# Patient Record
Sex: FEMALE | Race: WHITE | NOT HISPANIC OR LATINO | Employment: OTHER | URBAN - METROPOLITAN AREA
[De-identification: names, ages, dates, MRNs, and addresses within clinical notes are randomized per-mention and may not be internally consistent; named-entity substitution may affect disease eponyms.]

---

## 2019-01-21 ENCOUNTER — APPOINTMENT (OUTPATIENT)
Dept: GENERAL RADIOLOGY | Facility: HOSPITAL | Age: 84
End: 2019-01-21

## 2019-01-21 ENCOUNTER — APPOINTMENT (OUTPATIENT)
Dept: CT IMAGING | Facility: HOSPITAL | Age: 84
End: 2019-01-21

## 2019-01-21 ENCOUNTER — HOSPITAL ENCOUNTER (INPATIENT)
Facility: HOSPITAL | Age: 84
LOS: 4 days | Discharge: NURSING, REHAB OR LONG-TERM CARE | End: 2019-01-25
Attending: INTERNAL MEDICINE | Admitting: INTERNAL MEDICINE

## 2019-01-21 DIAGNOSIS — R13.10 DYSPHAGIA, UNSPECIFIED TYPE: ICD-10-CM

## 2019-01-21 DIAGNOSIS — Z74.09 IMPAIRED FUNCTIONAL MOBILITY, BALANCE, GAIT, AND ENDURANCE: ICD-10-CM

## 2019-01-21 DIAGNOSIS — Z78.9 IMPAIRED MOBILITY AND ADLS: Primary | ICD-10-CM

## 2019-01-21 DIAGNOSIS — Z74.09 IMPAIRED MOBILITY AND ADLS: Primary | ICD-10-CM

## 2019-01-21 DIAGNOSIS — R41.841 COGNITIVE COMMUNICATION DEFICIT: ICD-10-CM

## 2019-01-21 PROBLEM — E78.5 HYPERLIPIDEMIA: Status: ACTIVE | Noted: 2019-01-21

## 2019-01-21 PROBLEM — I48.92 ATRIAL FLUTTER (HCC): Status: ACTIVE | Noted: 2019-01-21

## 2019-01-21 PROBLEM — N18.9 CKD (CHRONIC KIDNEY DISEASE): Status: ACTIVE | Noted: 2019-01-21

## 2019-01-21 PROBLEM — I50.32 CHRONIC DIASTOLIC CONGESTIVE HEART FAILURE (HCC): Status: ACTIVE | Noted: 2019-01-21

## 2019-01-21 PROBLEM — Z86.73 H/O: CVA (CEREBROVASCULAR ACCIDENT): Status: ACTIVE | Noted: 2019-01-21

## 2019-01-21 PROBLEM — I63.9 STROKE (HCC): Status: ACTIVE | Noted: 2019-01-21

## 2019-01-21 PROBLEM — I10 ESSENTIAL HYPERTENSION: Status: ACTIVE | Noted: 2019-01-21

## 2019-01-21 LAB
ANION GAP SERPL CALCULATED.3IONS-SCNC: 8 MMOL/L (ref 3–11)
BUN BLD-MCNC: 50 MG/DL (ref 9–23)
BUN/CREAT SERPL: 33.6 (ref 7–25)
CALCIUM SPEC-SCNC: 8.3 MG/DL (ref 8.7–10.4)
CHLORIDE SERPL-SCNC: 105 MMOL/L (ref 99–109)
CO2 SERPL-SCNC: 26 MMOL/L (ref 20–31)
CREAT BLD-MCNC: 1.49 MG/DL (ref 0.6–1.3)
DEPRECATED RDW RBC AUTO: 58.1 FL (ref 37–54)
ERYTHROCYTE [DISTWIDTH] IN BLOOD BY AUTOMATED COUNT: 17.1 % (ref 11.3–14.5)
GFR SERPL CREATININE-BSD FRML MDRD: 34 ML/MIN/1.73
GLUCOSE BLD-MCNC: 127 MG/DL (ref 70–100)
GLUCOSE BLDC GLUCOMTR-MCNC: 106 MG/DL (ref 70–130)
HCT VFR BLD AUTO: 42.4 % (ref 34.5–44)
HGB BLD-MCNC: 13.3 G/DL (ref 11.5–15.5)
MAGNESIUM SERPL-MCNC: 1.9 MG/DL (ref 1.3–2.7)
MCH RBC QN AUTO: 29.3 PG (ref 27–31)
MCHC RBC AUTO-ENTMCNC: 31.4 G/DL (ref 32–36)
MCV RBC AUTO: 93.4 FL (ref 80–99)
PHOSPHATE SERPL-MCNC: 4.6 MG/DL (ref 2.4–5.1)
PLATELET # BLD AUTO: 265 10*3/MM3 (ref 150–450)
PMV BLD AUTO: 10.4 FL (ref 6–12)
POTASSIUM BLD-SCNC: 5 MMOL/L (ref 3.5–5.5)
RBC # BLD AUTO: 4.54 10*6/MM3 (ref 3.89–5.14)
SODIUM BLD-SCNC: 139 MMOL/L (ref 132–146)
WBC NRBC COR # BLD: 20.97 10*3/MM3 (ref 3.5–10.8)

## 2019-01-21 PROCEDURE — 93010 ELECTROCARDIOGRAM REPORT: CPT | Performed by: INTERNAL MEDICINE

## 2019-01-21 PROCEDURE — 82962 GLUCOSE BLOOD TEST: CPT

## 2019-01-21 PROCEDURE — 99221 1ST HOSP IP/OBS SF/LOW 40: CPT | Performed by: PSYCHIATRY & NEUROLOGY

## 2019-01-21 PROCEDURE — 99291 CRITICAL CARE FIRST HOUR: CPT | Performed by: INTERNAL MEDICINE

## 2019-01-21 PROCEDURE — 84100 ASSAY OF PHOSPHORUS: CPT | Performed by: NURSE PRACTITIONER

## 2019-01-21 PROCEDURE — 25010000002 FENTANYL CITRATE (PF) 100 MCG/2ML SOLUTION: Performed by: NURSE PRACTITIONER

## 2019-01-21 PROCEDURE — 85027 COMPLETE CBC AUTOMATED: CPT | Performed by: NURSE PRACTITIONER

## 2019-01-21 PROCEDURE — 83735 ASSAY OF MAGNESIUM: CPT | Performed by: NURSE PRACTITIONER

## 2019-01-21 PROCEDURE — 71045 X-RAY EXAM CHEST 1 VIEW: CPT

## 2019-01-21 PROCEDURE — 80048 BASIC METABOLIC PNL TOTAL CA: CPT | Performed by: NURSE PRACTITIONER

## 2019-01-21 PROCEDURE — 70450 CT HEAD/BRAIN W/O DYE: CPT

## 2019-01-21 PROCEDURE — 25010000002 PHENYLEPHRINE 10 MG/ML SOLUTION 5 ML VIAL: Performed by: NURSE PRACTITIONER

## 2019-01-21 PROCEDURE — 93005 ELECTROCARDIOGRAM TRACING: CPT | Performed by: INTERNAL MEDICINE

## 2019-01-21 PROCEDURE — 0 IOPAMIDOL PER 1 ML: Performed by: INTERNAL MEDICINE

## 2019-01-21 PROCEDURE — 70498 CT ANGIOGRAPHY NECK: CPT

## 2019-01-21 PROCEDURE — 70496 CT ANGIOGRAPHY HEAD: CPT

## 2019-01-21 RX ORDER — SODIUM CHLORIDE 0.9 % (FLUSH) 0.9 %
3-10 SYRINGE (ML) INJECTION AS NEEDED
Status: DISCONTINUED | OUTPATIENT
Start: 2019-01-21 | End: 2019-01-25 | Stop reason: HOSPADM

## 2019-01-21 RX ORDER — ACETAMINOPHEN 650 MG/1
650 SUPPOSITORY RECTAL EVERY 4 HOURS PRN
Status: DISCONTINUED | OUTPATIENT
Start: 2019-01-21 | End: 2019-01-25 | Stop reason: HOSPADM

## 2019-01-21 RX ORDER — FENTANYL CITRATE 50 UG/ML
25 INJECTION, SOLUTION INTRAMUSCULAR; INTRAVENOUS EVERY 4 HOURS PRN
Status: DISCONTINUED | OUTPATIENT
Start: 2019-01-21 | End: 2019-01-24

## 2019-01-21 RX ORDER — ATORVASTATIN CALCIUM 40 MG/1
80 TABLET, FILM COATED ORAL NIGHTLY
Status: DISCONTINUED | OUTPATIENT
Start: 2019-01-21 | End: 2019-01-25 | Stop reason: HOSPADM

## 2019-01-21 RX ORDER — SODIUM CHLORIDE, SODIUM LACTATE, POTASSIUM CHLORIDE, CALCIUM CHLORIDE 600; 310; 30; 20 MG/100ML; MG/100ML; MG/100ML; MG/100ML
500 INJECTION, SOLUTION INTRAVENOUS ONCE
Status: DISCONTINUED | OUTPATIENT
Start: 2019-01-21 | End: 2019-01-24

## 2019-01-21 RX ORDER — SODIUM CHLORIDE 0.9 % (FLUSH) 0.9 %
3 SYRINGE (ML) INJECTION EVERY 12 HOURS SCHEDULED
Status: DISCONTINUED | OUTPATIENT
Start: 2019-01-21 | End: 2019-01-25 | Stop reason: HOSPADM

## 2019-01-21 RX ORDER — ASPIRIN 300 MG/1
300 SUPPOSITORY RECTAL DAILY
Status: DISCONTINUED | OUTPATIENT
Start: 2019-01-22 | End: 2019-01-24

## 2019-01-21 RX ORDER — ASPIRIN 325 MG
325 TABLET ORAL DAILY
Status: DISCONTINUED | OUTPATIENT
Start: 2019-01-22 | End: 2019-01-25 | Stop reason: HOSPADM

## 2019-01-21 RX ADMIN — IOPAMIDOL 75 ML: 755 INJECTION, SOLUTION INTRAVENOUS at 18:07

## 2019-01-21 RX ADMIN — FENTANYL CITRATE 25 MCG: 50 INJECTION, SOLUTION INTRAMUSCULAR; INTRAVENOUS at 21:06

## 2019-01-21 RX ADMIN — ACETAMINOPHEN 650 MG: 650 SUPPOSITORY RECTAL at 21:06

## 2019-01-21 RX ADMIN — SODIUM CHLORIDE, PRESERVATIVE FREE 3 ML: 5 INJECTION INTRAVENOUS at 21:12

## 2019-01-21 RX ADMIN — SODIUM CHLORIDE 500 ML: 9 INJECTION, SOLUTION INTRAVENOUS at 20:06

## 2019-01-21 RX ADMIN — PHENYLEPHRINE HYDROCHLORIDE 0.5 MCG/KG/MIN: 10 INJECTION INTRAVENOUS at 21:17

## 2019-01-21 NOTE — NURSING NOTE
"Stroke Navigator CODE 19    HPI    Jennifer Gil is a 83 y.o.  female on whom I am evaluating as a Code 19 for a possible acute stroke.  Mrs. Gil is a resident at [facility] where she is currently receiving inpatient rehabilitation after a recent admission for UTI and pleural effusions.  Her granddaughter was visiting her and states that she was in her normal state of health at approximately 1200.  At 1415 staff noted that she had left sided facial droop, left sided weakness (worse than baseline), and dysarthria.  She was transported to [facility] where she had a CT head which was negative for hemorrhage and/or acute process.  She met criteria for IV alteplase which was discussed with her daughter who agreed to proceed with administration.  Dr. Alvares was contacted by who accepted her for transfer.  He asked [facility] to contact the interventional team to determine if she may be a candidate for neurological intervention.  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 Symptoms are currently unchanged.   · Patient is a candidate for thrombolytic therapy.  She met criteria for alteplase administration and this was started at the OSH.   · Patient is not a candidate for emergent Neuro Intervention due to no LVO (large vessel occlusion) present on CTA head however she does have a \"critical cervical right carotid stenosis\".  This was reviewed by Dr. Reynoso.    Stroke risk factors: atrial fibrillation, carotid stenosis, hyperlipidemia, hypertension " and prior stroke.     Prior stroke history: yes  Location: unknown - see prior imaging  Antiplatelet therapy: Aspirin 81mg  Anticoagulation: none     · Imaging performed: CT head, CTA head and CTA neck; unable to obtain IV access for CT perfusion.    NIHSS    Interval: baseline  1a. Level of Consciousness: 0-->Alert, keenly responsive  1b. LOC Questions: 2-->Answers neither question correctly  1c. LOC Commands: 0-->Performs both tasks correctly  2. Best Gaze: 1-->Partial gaze palsy, gaze is abnormal in one or both eyes, but forced deviation or total gaze paresis is not present  3. Visual: 1-->Partial hemianopia  4. Facial Palsy: 1-->Minor paralysis (flattened nasolabial fold, asymmetry on smiling)  5a. Motor Arm, Left: 3-->No effort against gravity, limb falls  5b. Motor Arm, Right: 0-->No drift, limb holds 90 (or 45) degrees for full 10 secs  6a. Motor Leg, Left: 2-->Some effort against gravity, leg falls to bed by 5 secs, but has some effort against gravity  6b. Motor Leg, Right: 0-->No drift, leg holds 30 degree position for full 5 secs  7. Limb Ataxia: 0-->Absent  8. Sensory: 1-->Mild-to-moderate sensory loss, patient feels pinprick is less sharp or is dull on the affected side, or there is a loss of superficial pain with pinprick, but patient is aware of being touched  9. Best Language: 0-->No aphasia, normal  10. Dysarthria: 2-->Severe dysarthria, patients speech is so slurred as to be unintelligible in the absence of or out of proportion to any dysphasia, or is mute/anarthric  11. Extinction and Inattention (formerly Neglect): 2-->Profound meredith-inattention/extinction more than 1 modality    Total (NIH Stroke Scale): 15    PLAN    Mrs. Gil was examined immediately upon arrival on the [facility].  She has severe focal neurological deficits.  She was taken directly to the CT scanner for STAT imaging.  Dr. Reynoso present in CT scanner.  We were unable to obtain an 18G IV therefore CTA head and neck were performed  "instead of CT perfusion.  Imaging was reviewed immediately upon completion by Dr. Reynoso.  This reveals a \"critical cervical right carotid stenosis, but no large vessel occlusion, which does not require emergent neurointervention.\"  Dr. Reynoso recommends pursuing medical management therapy(dual antiplatelet therapy) for her carotid disease pending further evaluation of her baseline function and recovery from her current ischemic event.    The stroke order set has been initiated.    We will defer to Neurology for further management of CVA.      1855: I spoke with Dr. Alvares to notify him of the patient's arrival.  He has been updated on the patient's current neurological status and vital signs.  He would like the patient's SBP to remain 140-180 for adequate cerebral perfusion.  He has ordered an addition 500mL NS bolus to be given should the patient's SBP drop below 140 and ayse-senephrine if SBP does not respond to bolus.  Spoke with RN re: orders.    Mei Nunez RN/STROKE NAVIGATOR    "

## 2019-01-21 NOTE — NURSING NOTE
ACC REVIEW REPORT: [facility]        PATIENT NAME: Jennifer Gil    PATIENT ID: [id number]    BED: N 222    BED TYPE: ICU    BED GIVEN TO: Ruth    TIME BED GIVEN: 1640    YOB: 1935    AGE: 83    GENDER: F    PREVIOUS ADMIT TO [facility]:     PREVIOUS ADMISSION DATE:     PATIENT CLASS:     TODAY'S DATE: 1/21/2019    TRANSFER DATE: 1-21-19    ETA: 1730    TRANSFERRING FACILITY: [facility]    TRANSFERRING FACILITY PHONE # : [phone]    TRANSFERRING MD: German Jhaveri    DATE/TIME REQUEST RECEIVED: 1-21-19@[facility]    [facility] RN: Deisi Farrell    REPORT FROM: Ruth in the ED    TIME REPORT TAKEN: 1630    DIAGNOSIS: CVA    REASON FOR TRANSFER TO [facility]: higher level of care    TRANSPORTATION: flying    CLINICAL REASON FOR TRANSFER TO [facility]: 83 y.o. Presented to local ED ~1500 with left facial droop, left UE & LE weakness and slurred speech - CT of head neg - TPA given      CLINICAL INFORMATION    HEIGHT:     WEIGHT: 85kg    ALLERGIES: keflex, pcn, muscle relaxer    DOWNS:     INFECTIOUS DISEASE:     ISOLATION:     LAST VITAL SIGNS:  TIME: 1640  TEMP:   PULSE: 94  B/P: 106/71  RESP: 20-30    LAB INFORMATION: WBC 19.45, fsbs 134, bun 36, Cr 1.6, PT 11.7, INR wnl    CULTURE INFORMATION:     MEDS/IV FLUIDS: #20 left forearm - 500cc NS bolus to be given  TPA bolus 7.65 given at 1609 --->68.5 infusing      CARDIAC SYSTEM:    CHEST PAIN: none reported    RHYTHM: afib    Is patient taking or has patient been given any drugs that could increase bleeding? yes  (Plavix, Brilinta, Effient, Eliquis, Xarelto, Warfarin, Integrilin, Angiomax)    DRUG: TPA    DOSE/FREQUENCY: see meds section  (pt takes ASA at home)      RESPIRATORY SYSTEM:    OXYGEN: 2 liters    O2 SAT: 94%    RADIOLOGY RESULTS: CXR - small rt pleural effusion, bibasilar opacity could be atelectasis    RESPIRATORY STATUS: labored at times; pt has a hx of COPD      CNS/MUSCULOSKELETAL    ALERT AND ORIENTED:  yes    DELIA COMA SCALE: 15    STROKE SCALE: 12    CAT  SCAN RESULTS: neg    CNS/MUSCULOSKELETAL NOTES: NIHSS positives: 1 LOC, 1 facial palsy, 2 LUE, 2 LLE, 2 limb ataxia, 2 best language, 2 dysarthria    Pt has a hx of TIA/? Past stroke with slight left sided weakness residual - per family - pt has been self-care; left sided weakness is moderate now      GI//GY NOTES: swallow test has not been done at time of report    PAST MEDICAL HISTORY: CHF, COPD, CVA, HTN    OTHER SYMPTOM NOTES: last known well today at noon    ADDITIONAL NOTES:   Dr. Alvares has been consulted; Dr. Reynoso has been notified  Pt is to go for perfusion scan on arrival          Pat Farrell RN  1/21/2019  4:47 PM

## 2019-01-22 ENCOUNTER — APPOINTMENT (OUTPATIENT)
Dept: CARDIOLOGY | Facility: HOSPITAL | Age: 84
End: 2019-01-22

## 2019-01-22 ENCOUNTER — APPOINTMENT (OUTPATIENT)
Dept: GENERAL RADIOLOGY | Facility: HOSPITAL | Age: 84
End: 2019-01-22

## 2019-01-22 ENCOUNTER — APPOINTMENT (OUTPATIENT)
Dept: CT IMAGING | Facility: HOSPITAL | Age: 84
End: 2019-01-22

## 2019-01-22 ENCOUNTER — APPOINTMENT (OUTPATIENT)
Dept: MRI IMAGING | Facility: HOSPITAL | Age: 84
End: 2019-01-22

## 2019-01-22 PROBLEM — I82.429 ACUTE DEEP VEIN THROMBOSIS (DVT) OF ILIAC VEIN (HCC): Status: ACTIVE | Noted: 2019-01-22

## 2019-01-22 LAB
ANION GAP SERPL CALCULATED.3IONS-SCNC: 8 MMOL/L (ref 3–11)
APTT PPP: 28.7 SECONDS (ref 85–120)
ARTICHOKE IGE QN: 37 MG/DL (ref 0–130)
BASOPHILS # BLD AUTO: 0.07 10*3/MM3 (ref 0–0.2)
BASOPHILS NFR BLD AUTO: 0.4 % (ref 0–1)
BH CV LOW VAS RIGHT COMMON FEMORAL SPONT: 1
BH CV LOW VAS RIGHT DISTAL FEMORAL SPONT: 1
BH CV LOW VAS RIGHT GASTRONEMIUS VESSEL: 1
BH CV LOW VAS RIGHT GREATER SAPH AK VESSEL: 1
BH CV LOW VAS RIGHT GREATER SAPH BK VESSEL: 1
BH CV LOW VAS RIGHT MID FEMORAL SPONT: 1
BH CV LOW VAS RIGHT POPLITEAL SPONT: 1
BH CV LOW VAS RIGHT POSTERIOR TIBIAL VESSEL: 1
BH CV LOW VAS RIGHT PROFUNDA FEMORAL SPONT: 1
BH CV LOW VAS RIGHT PROXIMAL FEMORAL SPONT: 1
BH CV LOW VAS RIGHT SAPHENOFEMORAL JUNCTION SPONT: 1
BH CV LOWER VASCULAR LEFT COMMON FEMORAL AUGMENT: NORMAL
BH CV LOWER VASCULAR LEFT COMMON FEMORAL COMPRESS: NORMAL
BH CV LOWER VASCULAR LEFT COMMON FEMORAL PHASIC: NORMAL
BH CV LOWER VASCULAR LEFT COMMON FEMORAL SPONT: NORMAL
BH CV LOWER VASCULAR LEFT DISTAL FEMORAL AUGMENT: NORMAL
BH CV LOWER VASCULAR LEFT DISTAL FEMORAL COMPRESS: NORMAL
BH CV LOWER VASCULAR LEFT DISTAL FEMORAL PHASIC: NORMAL
BH CV LOWER VASCULAR LEFT DISTAL FEMORAL SPONT: NORMAL
BH CV LOWER VASCULAR LEFT GASTRONEMIUS COMPRESS: NORMAL
BH CV LOWER VASCULAR LEFT GREATER SAPH AK COMPRESS: NORMAL
BH CV LOWER VASCULAR LEFT GREATER SAPH BK COMPRESS: NORMAL
BH CV LOWER VASCULAR LEFT LESSER SAPH COMPRESS: NORMAL
BH CV LOWER VASCULAR LEFT MID FEMORAL AUGMENT: NORMAL
BH CV LOWER VASCULAR LEFT MID FEMORAL COMPRESS: NORMAL
BH CV LOWER VASCULAR LEFT MID FEMORAL PHASIC: NORMAL
BH CV LOWER VASCULAR LEFT MID FEMORAL SPONT: NORMAL
BH CV LOWER VASCULAR LEFT POPLITEAL AUGMENT: NORMAL
BH CV LOWER VASCULAR LEFT POPLITEAL COMPRESS: NORMAL
BH CV LOWER VASCULAR LEFT POPLITEAL PHASIC: NORMAL
BH CV LOWER VASCULAR LEFT POPLITEAL SPONT: NORMAL
BH CV LOWER VASCULAR LEFT POSTERIOR TIBIAL COMPRESS: NORMAL
BH CV LOWER VASCULAR LEFT PROFUNDA FEMORAL COMPRESS: NORMAL
BH CV LOWER VASCULAR LEFT PROFUNDA FEMORAL PHASIC: NORMAL
BH CV LOWER VASCULAR LEFT PROFUNDA FEMORAL SPONT: NORMAL
BH CV LOWER VASCULAR LEFT PROXIMAL FEMORAL AUGMENT: NORMAL
BH CV LOWER VASCULAR LEFT PROXIMAL FEMORAL COMPRESS: NORMAL
BH CV LOWER VASCULAR LEFT PROXIMAL FEMORAL PHASIC: NORMAL
BH CV LOWER VASCULAR LEFT PROXIMAL FEMORAL SPONT: NORMAL
BH CV LOWER VASCULAR LEFT SAPHENOFEMORAL JUNCTION COMPRESS: NORMAL
BH CV LOWER VASCULAR LEFT SAPHENOFEMORAL JUNCTION PHASIC: NORMAL
BH CV LOWER VASCULAR LEFT SAPHENOFEMORAL JUNCTION SPONT: NORMAL
BH CV LOWER VASCULAR RIGHT COMMON FEMORAL COMPRESS: NORMAL
BH CV LOWER VASCULAR RIGHT COMMON FEMORAL PHASIC: NORMAL
BH CV LOWER VASCULAR RIGHT COMMON FEMORAL SPONT: NORMAL
BH CV LOWER VASCULAR RIGHT DISTAL FEMORAL COMPRESS: NORMAL
BH CV LOWER VASCULAR RIGHT DISTAL FEMORAL PHASIC: NORMAL
BH CV LOWER VASCULAR RIGHT DISTAL FEMORAL SPONT: NORMAL
BH CV LOWER VASCULAR RIGHT GASTRONEMIUS COMPRESS: NORMAL
BH CV LOWER VASCULAR RIGHT GREATER SAPH AK COMPRESS: NORMAL
BH CV LOWER VASCULAR RIGHT GREATER SAPH BK COMPRESS: NORMAL
BH CV LOWER VASCULAR RIGHT LESSER SAPH COMPRESS: NORMAL
BH CV LOWER VASCULAR RIGHT MID FEMORAL COMPRESS: NORMAL
BH CV LOWER VASCULAR RIGHT MID FEMORAL PHASIC: NORMAL
BH CV LOWER VASCULAR RIGHT MID FEMORAL SPONT: NORMAL
BH CV LOWER VASCULAR RIGHT POPLITEAL COMPRESS: NORMAL
BH CV LOWER VASCULAR RIGHT POPLITEAL PHASIC: NORMAL
BH CV LOWER VASCULAR RIGHT POPLITEAL SPONT: NORMAL
BH CV LOWER VASCULAR RIGHT POSTERIOR TIBIAL COMPRESS: NORMAL
BH CV LOWER VASCULAR RIGHT PROFUNDA FEMORAL COMPRESS: NORMAL
BH CV LOWER VASCULAR RIGHT PROFUNDA FEMORAL PHASIC: NORMAL
BH CV LOWER VASCULAR RIGHT PROFUNDA FEMORAL SPONT: NORMAL
BH CV LOWER VASCULAR RIGHT PROXIMAL FEMORAL COMPRESS: NORMAL
BH CV LOWER VASCULAR RIGHT PROXIMAL FEMORAL PHASIC: NORMAL
BH CV LOWER VASCULAR RIGHT PROXIMAL FEMORAL SPONT: NORMAL
BH CV LOWER VASCULAR RIGHT SAPHENOFEMORAL JUNCTION COMPRESS: NORMAL
BH CV LOWER VASCULAR RIGHT SAPHENOFEMORAL JUNCTION PHASIC: NORMAL
BH CV LOWER VASCULAR RIGHT SAPHENOFEMORAL JUNCTION SPONT: NORMAL
BUN BLD-MCNC: 43 MG/DL (ref 9–23)
BUN/CREAT SERPL: 31.4 (ref 7–25)
CALCIUM SPEC-SCNC: 8.9 MG/DL (ref 8.7–10.4)
CHLORIDE SERPL-SCNC: 110 MMOL/L (ref 99–109)
CHOLEST SERPL-MCNC: 85 MG/DL (ref 0–200)
CO2 SERPL-SCNC: 21 MMOL/L (ref 20–31)
CREAT BLD-MCNC: 1.37 MG/DL (ref 0.6–1.3)
DEPRECATED RDW RBC AUTO: 57.1 FL (ref 37–54)
DEPRECATED RDW RBC AUTO: 58.2 FL (ref 37–54)
EOSINOPHIL # BLD AUTO: 0.65 10*3/MM3 (ref 0–0.3)
EOSINOPHIL NFR BLD AUTO: 3.5 % (ref 0–3)
ERYTHROCYTE [DISTWIDTH] IN BLOOD BY AUTOMATED COUNT: 17 % (ref 11.3–14.5)
ERYTHROCYTE [DISTWIDTH] IN BLOOD BY AUTOMATED COUNT: 17.5 % (ref 11.3–14.5)
GFR SERPL CREATININE-BSD FRML MDRD: 37 ML/MIN/1.73
GLUCOSE BLD-MCNC: 102 MG/DL (ref 70–100)
GLUCOSE BLDC GLUCOMTR-MCNC: 94 MG/DL (ref 70–130)
HBA1C MFR BLD: 6.5 % (ref 4.8–5.6)
HCT VFR BLD AUTO: 44.1 % (ref 34.5–44)
HCT VFR BLD AUTO: 45 % (ref 34.5–44)
HDLC SERPL-MCNC: 35 MG/DL (ref 40–60)
HGB BLD-MCNC: 13.7 G/DL (ref 11.5–15.5)
HGB BLD-MCNC: 14.1 G/DL (ref 11.5–15.5)
IMM GRANULOCYTES # BLD AUTO: 0.27 10*3/MM3 (ref 0–0.03)
IMM GRANULOCYTES NFR BLD AUTO: 1.5 % (ref 0–0.6)
INR PPP: 1.44 (ref 0.85–1.16)
LYMPHOCYTES # BLD AUTO: 2.69 10*3/MM3 (ref 0.6–4.8)
LYMPHOCYTES NFR BLD AUTO: 14.5 % (ref 24–44)
MAGNESIUM SERPL-MCNC: 2.5 MG/DL (ref 1.3–2.7)
MCH RBC QN AUTO: 28.6 PG (ref 27–31)
MCH RBC QN AUTO: 29.1 PG (ref 27–31)
MCHC RBC AUTO-ENTMCNC: 31.1 G/DL (ref 32–36)
MCHC RBC AUTO-ENTMCNC: 31.3 G/DL (ref 32–36)
MCV RBC AUTO: 92.1 FL (ref 80–99)
MCV RBC AUTO: 92.8 FL (ref 80–99)
MONOCYTES # BLD AUTO: 1.11 10*3/MM3 (ref 0–1)
MONOCYTES NFR BLD AUTO: 6 % (ref 0–12)
NEUTROPHILS # BLD AUTO: 13.78 10*3/MM3 (ref 1.5–8.3)
NEUTROPHILS NFR BLD AUTO: 74.1 % (ref 41–71)
PHOSPHATE SERPL-MCNC: 4.4 MG/DL (ref 2.4–5.1)
PLAT MORPH BLD: NORMAL
PLATELET # BLD AUTO: 246 10*3/MM3 (ref 150–450)
PLATELET # BLD AUTO: 267 10*3/MM3 (ref 150–450)
PMV BLD AUTO: 10 FL (ref 6–12)
PMV BLD AUTO: 9.9 FL (ref 6–12)
POTASSIUM BLD-SCNC: 5 MMOL/L (ref 3.5–5.5)
PROTHROMBIN TIME: 16.9 SECONDS (ref 11.2–14.3)
RBC # BLD AUTO: 4.79 10*6/MM3 (ref 3.89–5.14)
RBC # BLD AUTO: 4.85 10*6/MM3 (ref 3.89–5.14)
RBC MORPH BLD: NORMAL
SODIUM BLD-SCNC: 139 MMOL/L (ref 132–146)
TRIGL SERPL-MCNC: 79 MG/DL (ref 0–150)
TSH SERPL DL<=0.05 MIU/L-ACNC: 1.96 MIU/ML (ref 0.35–5.35)
WBC MORPH BLD: NORMAL
WBC NRBC COR # BLD: 18.57 10*3/MM3 (ref 3.5–10.8)
WBC NRBC COR # BLD: 19.93 10*3/MM3 (ref 3.5–10.8)

## 2019-01-22 PROCEDURE — 82962 GLUCOSE BLOOD TEST: CPT

## 2019-01-22 PROCEDURE — 84100 ASSAY OF PHOSPHORUS: CPT | Performed by: NURSE PRACTITIONER

## 2019-01-22 PROCEDURE — 92523 SPEECH SOUND LANG COMPREHEN: CPT

## 2019-01-22 PROCEDURE — 25010000002 FENTANYL CITRATE (PF) 100 MCG/2ML SOLUTION: Performed by: NURSE PRACTITIONER

## 2019-01-22 PROCEDURE — 99231 SBSQ HOSP IP/OBS SF/LOW 25: CPT | Performed by: PSYCHIATRY & NEUROLOGY

## 2019-01-22 PROCEDURE — 97165 OT EVAL LOW COMPLEX 30 MIN: CPT

## 2019-01-22 PROCEDURE — 85520 HEPARIN ASSAY: CPT

## 2019-01-22 PROCEDURE — 93306 TTE W/DOPPLER COMPLETE: CPT

## 2019-01-22 PROCEDURE — 93970 EXTREMITY STUDY: CPT | Performed by: INTERNAL MEDICINE

## 2019-01-22 PROCEDURE — 92610 EVALUATE SWALLOWING FUNCTION: CPT

## 2019-01-22 PROCEDURE — C1751 CATH, INF, PER/CENT/MIDLINE: HCPCS

## 2019-01-22 PROCEDURE — 97162 PT EVAL MOD COMPLEX 30 MIN: CPT

## 2019-01-22 PROCEDURE — 99291 CRITICAL CARE FIRST HOUR: CPT | Performed by: INTERNAL MEDICINE

## 2019-01-22 PROCEDURE — C1894 INTRO/SHEATH, NON-LASER: HCPCS

## 2019-01-22 PROCEDURE — 83735 ASSAY OF MAGNESIUM: CPT | Performed by: NURSE PRACTITIONER

## 2019-01-22 PROCEDURE — 93306 TTE W/DOPPLER COMPLETE: CPT | Performed by: INTERNAL MEDICINE

## 2019-01-22 PROCEDURE — 70450 CT HEAD/BRAIN W/O DYE: CPT

## 2019-01-22 PROCEDURE — 70551 MRI BRAIN STEM W/O DYE: CPT

## 2019-01-22 PROCEDURE — 97530 THERAPEUTIC ACTIVITIES: CPT

## 2019-01-22 PROCEDURE — 25010000002 HEPARIN (PORCINE) PER 1000 UNITS: Performed by: INTERNAL MEDICINE

## 2019-01-22 PROCEDURE — 25010000002 ONDANSETRON PER 1 MG: Performed by: NURSE PRACTITIONER

## 2019-01-22 PROCEDURE — 85025 COMPLETE CBC W/AUTO DIFF WBC: CPT | Performed by: INTERNAL MEDICINE

## 2019-01-22 PROCEDURE — 71045 X-RAY EXAM CHEST 1 VIEW: CPT

## 2019-01-22 PROCEDURE — 02HV33Z INSERTION OF INFUSION DEVICE INTO SUPERIOR VENA CAVA, PERCUTANEOUS APPROACH: ICD-10-PCS | Performed by: INTERNAL MEDICINE

## 2019-01-22 PROCEDURE — 80048 BASIC METABOLIC PNL TOTAL CA: CPT | Performed by: NURSE PRACTITIONER

## 2019-01-22 PROCEDURE — 85027 COMPLETE CBC AUTOMATED: CPT | Performed by: NURSE PRACTITIONER

## 2019-01-22 PROCEDURE — 84443 ASSAY THYROID STIM HORMONE: CPT | Performed by: NURSE PRACTITIONER

## 2019-01-22 PROCEDURE — 85610 PROTHROMBIN TIME: CPT | Performed by: INTERNAL MEDICINE

## 2019-01-22 PROCEDURE — 80061 LIPID PANEL: CPT | Performed by: NURSE PRACTITIONER

## 2019-01-22 PROCEDURE — 85007 BL SMEAR W/DIFF WBC COUNT: CPT | Performed by: INTERNAL MEDICINE

## 2019-01-22 PROCEDURE — 25010000002 PHENYLEPHRINE 10 MG/ML SOLUTION 5 ML VIAL: Performed by: NURSE PRACTITIONER

## 2019-01-22 PROCEDURE — 93970 EXTREMITY STUDY: CPT

## 2019-01-22 PROCEDURE — 97110 THERAPEUTIC EXERCISES: CPT

## 2019-01-22 PROCEDURE — 85730 THROMBOPLASTIN TIME PARTIAL: CPT | Performed by: INTERNAL MEDICINE

## 2019-01-22 PROCEDURE — 83036 HEMOGLOBIN GLYCOSYLATED A1C: CPT | Performed by: NURSE PRACTITIONER

## 2019-01-22 RX ORDER — SODIUM CHLORIDE 0.9 % (FLUSH) 0.9 %
20 SYRINGE (ML) INJECTION AS NEEDED
Status: DISCONTINUED | OUTPATIENT
Start: 2019-01-22 | End: 2019-01-25 | Stop reason: HOSPADM

## 2019-01-22 RX ORDER — HYDROCODONE BITARTRATE AND ACETAMINOPHEN 7.5; 325 MG/1; MG/1
1 TABLET ORAL 2 TIMES DAILY PRN
COMMUNITY
End: 2019-01-25 | Stop reason: HOSPADM

## 2019-01-22 RX ORDER — SODIUM CHLORIDE 0.9 % (FLUSH) 0.9 %
10 SYRINGE (ML) INJECTION EVERY 12 HOURS SCHEDULED
Status: DISCONTINUED | OUTPATIENT
Start: 2019-01-22 | End: 2019-01-25 | Stop reason: HOSPADM

## 2019-01-22 RX ORDER — FUROSEMIDE 40 MG/1
40 TABLET ORAL 2 TIMES DAILY
COMMUNITY
End: 2019-01-25 | Stop reason: HOSPADM

## 2019-01-22 RX ORDER — SODIUM CHLORIDE 0.9 % (FLUSH) 0.9 %
10 SYRINGE (ML) INJECTION AS NEEDED
Status: DISCONTINUED | OUTPATIENT
Start: 2019-01-22 | End: 2019-01-25 | Stop reason: HOSPADM

## 2019-01-22 RX ORDER — DIPHENHYDRAMINE HCL 25 MG
25 CAPSULE ORAL ONCE
Status: COMPLETED | OUTPATIENT
Start: 2019-01-22 | End: 2019-01-24

## 2019-01-22 RX ORDER — ASPIRIN 81 MG/1
81 TABLET, CHEWABLE ORAL DAILY
COMMUNITY

## 2019-01-22 RX ORDER — RAMIPRIL 2.5 MG/1
2.5 CAPSULE ORAL DAILY
COMMUNITY
End: 2019-01-25 | Stop reason: HOSPADM

## 2019-01-22 RX ORDER — NYSTATIN 100000 [USP'U]/G
POWDER TOPICAL EVERY 12 HOURS SCHEDULED
Status: DISCONTINUED | OUTPATIENT
Start: 2019-01-22 | End: 2019-01-25 | Stop reason: HOSPADM

## 2019-01-22 RX ORDER — CARVEDILOL 12.5 MG/1
12.5 TABLET ORAL 2 TIMES DAILY WITH MEALS
COMMUNITY

## 2019-01-22 RX ORDER — CLONAZEPAM 1 MG/1
1 TABLET ORAL 2 TIMES DAILY PRN
COMMUNITY

## 2019-01-22 RX ORDER — ONDANSETRON 2 MG/ML
4 INJECTION INTRAMUSCULAR; INTRAVENOUS EVERY 6 HOURS PRN
Status: DISCONTINUED | OUTPATIENT
Start: 2019-01-22 | End: 2019-01-25 | Stop reason: HOSPADM

## 2019-01-22 RX ADMIN — FENTANYL CITRATE 25 MCG: 50 INJECTION, SOLUTION INTRAMUSCULAR; INTRAVENOUS at 02:35

## 2019-01-22 RX ADMIN — FENTANYL CITRATE 25 MCG: 50 INJECTION, SOLUTION INTRAMUSCULAR; INTRAVENOUS at 13:53

## 2019-01-22 RX ADMIN — FENTANYL CITRATE 25 MCG: 50 INJECTION, SOLUTION INTRAMUSCULAR; INTRAVENOUS at 06:34

## 2019-01-22 RX ADMIN — FENTANYL CITRATE 25 MCG: 50 INJECTION, SOLUTION INTRAMUSCULAR; INTRAVENOUS at 23:09

## 2019-01-22 RX ADMIN — ACETAMINOPHEN 650 MG: 650 SUPPOSITORY RECTAL at 23:10

## 2019-01-22 RX ADMIN — SODIUM CHLORIDE, PRESERVATIVE FREE 10 ML: 5 INJECTION INTRAVENOUS at 20:26

## 2019-01-22 RX ADMIN — NYSTATIN 1 APPLICATION: 100000 POWDER TOPICAL at 12:52

## 2019-01-22 RX ADMIN — ONDANSETRON 4 MG: 2 INJECTION INTRAMUSCULAR; INTRAVENOUS at 13:53

## 2019-01-22 RX ADMIN — FENTANYL CITRATE 25 MCG: 50 INJECTION, SOLUTION INTRAMUSCULAR; INTRAVENOUS at 18:01

## 2019-01-22 RX ADMIN — PHENYLEPHRINE HYDROCHLORIDE 2 MCG/KG/MIN: 10 INJECTION INTRAVENOUS at 04:45

## 2019-01-22 RX ADMIN — HEPARIN SODIUM 18 UNITS/KG/HR: 10000 INJECTION, SOLUTION INTRAVENOUS at 17:49

## 2019-01-22 RX ADMIN — ASPIRIN 300 MG: 300 SUPPOSITORY RECTAL at 23:50

## 2019-01-22 RX ADMIN — NYSTATIN: 100000 POWDER TOPICAL at 20:26

## 2019-01-22 NOTE — PROGRESS NOTES
Discharge Planning Assessment  [facility]     Patient Name: Jennifer Gil  MRN: [id number]  Today's Date: 1/22/2019    Admit Date: 1/21/2019    Discharge Needs Assessment     Row Name 01/22/19 1425       Living Environment    Lives With  facility resident    Name(s) of Who Lives With Patient  Ms. Gil has been a patient at [facility] in [place] for 7 years.      Current Living Arrangements  residential facility    Primary Care Provided by  other (see comments)    Provides Primary Care For  no one, unable/limited ability to care for self    Family Caregiver if Needed  child(joe), adult    Quality of Family Relationships  supportive    Able to Return to Prior Arrangements  yes       Resource/Environmental Concerns    Resource/Environmental Concerns  none    Transportation Concerns  other (see comments)       Transition Planning    Patient/Family Anticipates Transition to  inpatient rehabilitation facility    Transportation Anticipated  other (see comments)       Discharge Needs Assessment    Readmission Within the Last 30 Days  no previous admission in last 30 days    Concerns to be Addressed  adjustment to diagnosis/illness;basic needs    Equipment Currently Used at Home  hospital bed;walker, rolling;wheelchair    Anticipated Changes Related to Illness  inability to care for self    Discharge Facility/Level of Care Needs  nursing facility, skilled        Discharge Plan     Row Name 01/22/19 8700       Plan    Plan  [facility] in [place]    Patient/Family in Agreement with Plan  yes    Plan Comments  Talked to Ms. Gil's daughter and grandchildren at .  She is a patient at [facility] in [place], [place].  She has been a patient there for 7 years.  She uses a w/c and hospital bed at the facility.  She is dependent c ADL's.  Her PCP is Dr. Keaton Urrutia.  She gets her medications through the [facility].  She has a Medicaid bed hold there.  The goal is to return to [facility] when medically ready.   Will need ambulance for transport.      Final Discharge Disposition Code  03 - skilled nursing facility (SNF)        Destination      No service coordination in this encounter.      Durable Medical Equipment      No service coordination in this encounter.      Dialysis/Infusion      No service coordination in this encounter.      Home Medical Care      No service coordination in this encounter.      Community Resources      No service coordination in this encounter.          Demographic Summary    No documentation.       Functional Status     Row Name 01/22/19 1428       Functional Status    Usual Activity Tolerance  fair    Current Activity Tolerance  poor       Functional Status, IADL    Medications  assistive person    Meal Preparation  assistive person    Housekeeping  assistive person    Laundry  assistive person    Shopping  assistive person       Mental Status Summary    Recent Changes in Mental Status/Cognitive Functioning  unable to assess       Employment/    Employment Status  retired        Psychosocial    No documentation.       Abuse/Neglect    No documentation.       Legal    No documentation.       Substance Abuse    No documentation.       Patient Forms    No documentation.           April Rollins RN

## 2019-01-22 NOTE — PLAN OF CARE
Problem: Patient Care Overview  Goal: Plan of Care Review  Outcome: Ongoing (interventions implemented as appropriate)   01/22/19 0858   Coping/Psychosocial   Plan of Care Reviewed With patient   Plan of Care Review   Progress improving   OTHER   Outcome Summary OT completed a brief chart review relating to presenting physical/cognitive deficits. Pt Mod Ax1 for bed mobility and progressed static sitting balance to CGA. Pt limited d/t L sided weakness and dysarthria. Recommend cont skilled IPOT POC. Recommend pt DC to SNF.        Problem: Stroke (Ischemic) (Adult)  Goal: Signs and Symptoms of Listed Potential Problems Will be Absent, Minimized or Managed (Stroke)  Outcome: Ongoing (interventions implemented as appropriate)   01/22/19 0858   Goal/Outcome Evaluation   Problems Assessed (Stroke (Ischemic)) cognitive impairment;communication impairment;motor/sensory impairment   Problems Assessed (Stroke (Ischemic)) communication impairment;motor/sensory impairment;cognitive impairment

## 2019-01-22 NOTE — THERAPY EVALUATION
Acute Care - Physical Therapy Initial Evaluation  [facility]     Patient Name: Jennifer Gil  : 1935  MRN: [id number]  Today's Date: 2019   Onset of Illness/Injury or Date of Surgery: 19  Date of Referral to PT: 19  Referring Physician: BEAR Osborne      Admit Date: 2019    Visit Dx:     ICD-10-CM ICD-9-CM   1. Impaired mobility and ADLs Z74.09 799.89   2. Impaired functional mobility, balance, gait, and endurance Z74.09 V49.89     Patient Active Problem List   Diagnosis   • Stroke (CMS/HCC)   • Atrial flutter (CMS/HCC)   • CKD (chronic kidney disease)   • Chronic diastolic congestive heart failure (CMS/HCC)   • Essential hypertension   • H/O: CVA (with persistent left sided weakness)   • Hyperlipidemia     Past Medical History:   Diagnosis Date   • Anxiety    • CVA (cerebral vascular accident) (CMS/HCC)    • Diastolic dysfunction    • HLD (hyperlipidemia)    • Hypertension      Past Surgical History:   Procedure Laterality Date   • PARTIAL HYSTERECTOMY          PT ASSESSMENT (last 12 hours)      Physical Therapy Evaluation     Row Name 19 0829          PT Evaluation Time/Intention    Subjective Information  complains of;pain  -LS     Document Type  evaluation  -LS     Patient Effort  good  -LS     Symptoms Noted During/After Treatment  none  -LS     Row Name 19 0829          General Information    Patient Profile Reviewed?  yes  -LS     Onset of Illness/Injury or Date of Surgery  19  -LS     Referring Physician  BEAR Osborne  -LS     Patient Observations  alert;cooperative;agree to therapy  -LS     Prior Level of Function  mod assist:;max assist:;all household mobility;ADL's;bathing;dressing questionable historian; was indep prior to recent illnesses  -LS     Equipment Currently Used at Home  walker, rolling  -LS     Pertinent History of Current Functional Problem  To ED with acute onset of L facial droop, L-sided weakness and dysarthria. S/p tPA and transfer to  [facility]. Found to have R MCA deficits caused by suspected emobolism from R ICA stenosis vs afib. PMH significant for CVA with L residual weakness.   -LS     Existing Precautions/Restrictions  fall;oxygen therapy device and L/min;other (see comments) L-sided weakness  -LS     Limitations/Impairments  safety/cognitive  -LS     Risks Reviewed  patient:;LOB;dizziness;increased discomfort;change in vital signs  -LS     Benefits Reviewed  patient:;improve function;increase independence;increase strength;increase knowledge  -     Barriers to Rehab  medically complex  -     Row Name 01/22/19 0829          Relationship/Environment    Lives With  facility resident  -     Row Name 01/22/19 0829          Resource/Environmental Concerns    Current Living Arrangements  residential facility was undergoing short-term rehab PTA  -     Row Name 01/22/19 0829          Cognitive Assessment/Interventions    Additional Documentation  Cognitive Assessment/Intervention (Group)  -     Row Name 01/22/19 0829          Cognitive Assessment/Intervention- PT/OT    Affect/Mental Status (Cognitive)  confused  -     Orientation Status (Cognition)  oriented to;person;time;place  -     Follows Commands (Cognition)  follows one step commands;75-90% accuracy;repetition of directions required;verbal cues/prompting required;physical/tactile prompts required  -     Cognitive Function (Cognitive)  safety deficit  -     Safety Deficit (Cognitive)  mild deficit;awareness of need for assistance;insight into deficits/self awareness;safety precautions awareness  -     Personal Safety Interventions  fall prevention program maintained;gait belt;nonskid shoes/slippers when out of bed  -     Row Name 01/22/19 0829          Safety Issues, Functional Mobility    Impairments Affecting Function (Mobility)  balance;cognition;coordination;endurance/activity tolerance;motor planning;pain;shortness of breath;strength;postural/trunk control  -     Row  Name 01/22/19 0829          Bed Mobility Assessment/Treatment    Comment (Bed Mobility)  Sitting EOB with OT upon arrival; transitioned to PT eval.   -     Row Name 01/22/19 0829          Transfer Assessment/Treatment    Transfer Assessment/Treatment  bed-chair transfer;sit-stand transfer;stand-sit transfer  -     Comment (Transfers)  Stood initially with mid x2 A; noted difficulty advancing LLE with impulsivity to sit prior to fully squaring hips to edge of recliner. PT/tech on either side of pt for BUE support and blocking knees.   -LS     Bed-Chair [place] (Transfers)  maximum assist (25% patient effort);2 person assist;verbal cues  -LS     Sit-Stand [place] (Transfers)  minimum assist (75% patient effort);2 person assist;verbal cues  -LS     Stand-Sit [place] (Transfers)  moderate assist (50% patient effort);2 person assist;verbal cues  -     Row Name 01/22/19 0829          Gait/Stairs Assessment/Training    [place] Level (Gait)  unable to assess  -     Row Name 01/22/19 0829          General ROM    GENERAL ROM COMMENTS  BLE WFL  -     Row Name 01/22/19 0829          MMT (Manual Muscle Testing)    General MMT Comments  RLE 3+/5; L hip flex 3-/5, L knee ext/ ankle DF grossly 3/5  -     Row Name 01/22/19 0829          Motor Assessment/Intervention    Additional Documentation  Balance (Group);Therapeutic Exercise (Group)  -     Row Name 01/22/19 0829          Therapeutic Exercise    Therapeutic Exercise  supine, lower extremities  -     Additional Documentation  Therapeutic Exercise (Row)  -     93474 - PT Therapeutic Exercise Minutes  5  -     27290 - PT Therapeutic Activity Minutes  3  -     Row Name 01/22/19 0829          Lower Extremity Supine Therapeutic Exercise    Performed, Supine Lower Extremity (Therapeutic Exercise)  hip flexion/extension;hip abduction/adduction;ankle pumps long sitting in recliner  -     Exercise Type, Supine Lower Extremity (Therapeutic  Exercise)  AAROM (active assistive range of motion)  -     Sets/Reps Detail, Supine Lower Extremity (Therapeutic Exercise)  1/10 BLE  -     Row Name 01/22/19 0829          Gross Motor Coordination    Gross Motor Impairments  -- TBA further  -     Row Name 01/22/19 0829          Balance    Balance  static sitting balance;static standing balance  -     Row Name 01/22/19 0829          Static Sitting Balance    Level of [place] (Unsupported Sitting, Static Balance)  minimal assist, 75% patient effort  -LS     Sitting Position (Unsupported Sitting, Static Balance)  sitting on edge of bed  -     Row Name 01/22/19 0829          Static Standing Balance    Level of [place] (Supported Standing, Static Balance)  moderate assist, 50 to 74% patient effort x2  -     Row Name 01/22/19 0829          Sensory Assessment/Intervention    Sensory General Assessment  no sensation deficits identified [facility]  [facility] Name 01/22/19 0829          Pain Assessment    Additional Documentation  Pain Scale: Numbers Pre/Post-Treatment (Group)  -[facility] Name 01/22/19 0829          Pain Scale: Numbers Pre/Post-Treatment    Pain Scale: Numbers, Pretreatment  9/10  -     Pain Scale: Numbers, Post-Treatment  9/10  -     Pain Location  back c/o chronic LBP  -LS     Pre/Post Treatment Pain Comment  tolerated  -     Pain Intervention(s)  Repositioned;Ambulation/increased activity  -     Row Name 01/22/19 0829          Plan of Care Review    Plan of Care Reviewed With  patient  -     Row Name 01/22/19 0829          Physical Therapy Clinical Impression    Date of Referral to PT  01/21/19  -     PT Diagnosis (PT Clinical Impression)  impaired functional mobility, balance, gait  -LS     Patient/Family Goals Statement (PT Clinical Impression)  return to PLOF  -     Criteria for Skilled Interventions Met (PT Clinical Impression)  yes;treatment indicated  -     Rehab Potential (PT Clinical Summary)  good, to achieve  stated therapy goals  -LS     Care Plan Review (PT)  evaluation/treatment results reviewed  -     Row Name 01/22/19 0829          Vital Signs    Pre Systolic BP Rehab  101  -LS     Pre Treatment Diastolic BP  48  -LS     Pretreatment Heart Rate (beats/min)  105  -LS     Posttreatment Heart Rate (beats/min)  130  -LS     O2 Delivery Pre Treatment  supplemental O2  -LS     Post SpO2 (%)  95  -LS     O2 Delivery Post Treatment  supplemental O2  -LS     Pre Patient Position  Sitting  -LS     Intra Patient Position  Standing  -LS     Post Patient Position  Sitting  -LS     Row Name 01/22/19 0829          Physical Therapy Goals    Bed Mobility Goal Selection (PT)  bed mobility, PT goal 1  -LS     Transfer Goal Selection (PT)  transfer, PT goal 1  -LS     Gait Training Goal Selection (PT)  gait training, PT goal 1  -     Row Name 01/22/19 0829          Bed Mobility Goal 1 (PT)    Activity/Assistive Device (Bed Mobility Goal 1, PT)  sit to supine/supine to sit  -LS     [place] Level/Cues Needed (Bed Mobility Goal 1, PT)  minimum assist (75% or more patient effort)  -LS     Time Frame (Bed Mobility Goal 1, PT)  2 weeks  -LS     Progress/Outcomes (Bed Mobility Goal 1, PT)  goal ongoing  -     Row Name 01/22/19 0829          Transfer Goal 1 (PT)    Activity/Assistive Device (Transfer Goal 1, PT)  sit-to-stand/stand-to-sit;walker, rolling  -LS     [place] Level/Cues Needed (Transfer Goal 1, PT)  minimum assist (75% or more patient effort)  -LS     Time Frame (Transfer Goal 1, PT)  2 weeks  -LS     Progress/Outcome (Transfer Goal 1, PT)  goal ongoing  -     Row Name 01/22/19 0829          Gait Training Goal 1 (PT)    Activity/Assistive Device (Gait Training Goal 1, PT)  gait (walking locomotion);walker, rolling  -LS     [place] Level (Gait Training Goal 1, PT)  moderate assist (50-74% patient effort)  -LS     Distance (Gait Goal 1, PT)  25  -LS     Time Frame (Gait Training Goal 1, PT)  2 weeks  -LS      Progress/Outcome (Gait Training Goal 1, PT)  goal ongoing  -     Row Name 01/22/19 0829          Positioning and Restraints    Pre-Treatment Position  in bed  -LS     Post Treatment Position  chair  -LS     In Chair  notified nsg;reclined;call light within reach;encouraged to call for assist;exit alarm on;RUE elevated;LUE elevated;waffle cushion;on mechanical lift sling;legs elevated;heels elevated  -       User Key  (r) = Recorded By, (t) = Taken By, (c) = Cosigned By    Initials Name Provider Type    Dinah Saldivar, PT Physical Therapist        Physical Therapy Education     Title: PT OT SLP Therapies (In Progress)     Topic: Physical Therapy (In Progress)     Point: Mobility training (In Progress)     Learning Progress Summary           Patient Acceptance, E,D, NR by  at 1/22/2019  8:29 AM                   Point: Home exercise program (In Progress)     Learning Progress Summary           Patient Acceptance, E,D, NR by  at 1/22/2019  8:29 AM                   Point: Body mechanics (In Progress)     Learning Progress Summary           Patient Acceptance, E,D, NR by  at 1/22/2019  8:29 AM                   Point: Precautions (In Progress)     Learning Progress Summary           Patient Acceptance, E,D, NR by  at 1/22/2019  8:29 AM                               User Key     Initials Effective Dates Name Provider Type [facility] 06/19/15 -  Dinah Adhikari, PT Physical Therapist PT              PT Recommendation and Plan  Anticipated Discharge Disposition (PT): skilled nursing facility  Planned Therapy Interventions (PT Eval): balance training, bed mobility training, gait training, home exercise program, patient/family education, strengthening, transfer training  Therapy Frequency (PT Clinical Impression): daily  Outcome Summary/Treatment Plan (PT)  Anticipated Discharge Disposition (PT): skilled nursing facility  Plan of Care Reviewed With: patient  Outcome Summary: PT evaluation completed. Pt  demonstrates LLE weakness and decreased indep/safety re: functional mobility (with evolving signs/symptoms), warranting further skilled PT services to promote PLOF. Limited today by difficulty advancing LLE during transfer to the recliner; noted decreased safety awareness. Recommend SNF at d/c.   Outcome Measures     Row Name 01/22/19 0829 01/22/19 0805          How much help from another person do you currently need...    Turning from your back to your side while in flat bed without using bedrails?  2  -LS  --     Moving from lying on back to sitting on the side of a flat bed without bedrails?  2  -LS  --     Moving to and from a bed to a chair (including a wheelchair)?  2  -LS  --     Standing up from a chair using your arms (e.g., wheelchair, bedside chair)?  2  -LS  --     Climbing 3-5 steps with a railing?  1  -LS  --     To walk in hospital room?  1  -LS  --     AM-PAC 6 Clicks Score  10  -LS  --        How much help from another is currently needed...    Putting on and taking off regular lower body clothing?  --  1  -CL     Bathing (including washing, rinsing, and drying)  --  1  -CL     Toileting (which includes using toilet bed pan or urinal)  --  1  -CL     Putting on and taking off regular upper body clothing  --  2  -CL     Taking care of personal grooming (such as brushing teeth)  --  2  -CL     Eating meals  --  2  -CL     Score  --  9  -CL        Modified [place] Scale    Modified [place] Scale  4 - Moderately severe disability.  Unable to walk without assistance, and unable to attend to own bodily needs without assistance.  -LS  4 - Moderately severe disability.  Unable to walk without assistance, and unable to attend to own bodily needs without assistance.  -CL        Functional Assessment    Outcome Measure Options  AM-PAC 6 Clicks Basic Mobility (PT)  -LS  AM-PAC 6 Clicks Daily Activity (OT);Modified [place]  -CL       User Key  (r) = Recorded By, (t) = Taken By, (c) = Cosigned By    Initials Name  Provider Type    Dinah Saldivar, PT Physical Therapist    CL Cherie Cabello, OT Occupational Therapist         Time Calculation:   PT Charges     Row Name 01/22/19 0829             Time Calculation    Start Time  0829  -LS      PT Received On  01/22/19  -LS      PT Goal Re-Cert Due Date  02/01/19  -LS         Time Calculation- PT    Total Timed Code Minutes- PT  8 minute(s)  -LS         Timed Charges    40897 - PT Therapeutic Exercise Minutes  5  -LS      13797 - PT Therapeutic Activity Minutes  3  -LS        User Key  (r) = Recorded By, (t) = Taken By, (c) = Cosigned By    Initials Name Provider Type    LS Dinah Adhikari, PT Physical Therapist        Therapy Suggested Charges     Code   Minutes Charges    69479 (CPT®) Hc Pt Neuromusc Re Education Ea 15 Min      00338 (CPT®) Hc Pt Ther Proc Ea 15 Min 5 1    40323 (CPT®) Hc Gait Training Ea 15 Min      65450 (CPT®) Hc Pt Therapeutic Act Ea 15 Min 3     73558 (CPT®) Hc Pt Manual Therapy Ea 15 Min      32931 (CPT®) Hc Pt Iontophoresis Ea 15 Min      05607 (CPT®) Hc Pt Elec Stim Ea-Per 15 Min      75952 (CPT®) Hc Pt Ultrasound Ea 15 Min      44360 (CPT®) Hc Pt Self Care/Mgmt/Train Ea 15 Min      75346 (CPT®) Hc Pt Prosthetic (S) Train Initial Encounter, Each 15 Min      26672 (CPT®) Hc Pt Orthotic(S)/Prosthetic(S) Encounter, Each 15 Min      74218 (CPT®) Hc Orthotic(S) Mgmt/Train Initial Encounter, Each 15min      Total  8 1        Therapy Charges for Today     Code Description Service Date Service Provider Modifiers Qty    39926014755 HC PT EVAL MOD COMPLEXITY 3 1/22/2019 Dinah Adhikari, PT GP 1    67974087416 HC PT THER PROC EA 15 MIN 1/22/2019 Dinah Adhikari, PT GP 1    08367098295 HC PT THER SUPP EA 15 MIN 1/22/2019 Dinah Adhikari, PT GP 1          PT G-Codes  Outcome Measure Options: AM-PAC 6 Clicks Basic Mobility (PT)  AM-PAC 6 Clicks Score: 10  Score: 9  Modified [place] Scale: 4 - Moderately severe disability.  Unable to walk without assistance, and unable to  attend to own bodily needs without assistance.      Dinah Adhikari, PT  1/22/2019

## 2019-01-22 NOTE — PROGRESS NOTES
Neurology       Patient Care Team:  Keaton Urrutia MD as PCP - General (Family Medicine)    Chief complaint: cva    History:  83-year-old woman admitted wth an NIH sokscale of 16  Past Medical History:   Diagnosis Date   • Anxiety    • CVA (cerebral vascular accident) (CMS/HCC)    • Diastolic dysfunction    • HLD (hyperlipidemia)    • Hypertension        Vital Signs   Vitals:    01/22/19 0830 01/22/19 0845 01/22/19 0900 01/22/19 0915   BP:  129/69 136/97 110/86   BP Location:       Patient Position:       Pulse: (!) 121 112 120 (!) 125   Resp:       Temp:       TempSrc:       SpO2:  96% 96% 93%   Weight:       Height:           Physical Exam:.   General:  Awake and alert              Neuro: orinted to person and place but not time      speech I soft and articlate     he has a left hemianopsia and a lef central facial ekess.    Her arms and legs move well both sides.    Results Review:  Mri shows 2 tiny right parietal nfarcts    cta- critical right carotid stenosis  Results from last 7 days   Lab Units 01/22/19  0431   WBC 10*3/mm3 19.93*   HEMOGLOBIN g/dL 13.7   HEMATOCRIT % 44.1*   PLATELETS 10*3/mm3 267     Results from last 7 days   Lab Units 01/22/19  0431 01/21/19  2051   SODIUM mmol/L 139 139   POTASSIUM mmol/L 5.0 5.0   CHLORIDE mmol/L 110* 105   CO2 mmol/L 21.0 26.0   BUN mg/dL 43* 50*   CREATININE mg/dL 1.37* 1.49*   CALCIUM mg/dL 8.9 8.3*   GLUCOSE mg/dL 102* 127*       Imaging Results (last 24 hours)     Procedure Component Value Units Date/Time    MRI Brain Without Contrast [[id number]] Collected:  01/22/19 0952     Updated:  01/22/19 0952    Narrative:       EXAMINATION: MRI BRAIN WO CONTRAST-      INDICATION: Stroke.     TECHNIQUE:  Multiplanar multisequence MRI of the brain was performed  without contrast.     COMPARISONS:  CT head/CTA Head and neck 01/21/2019.     FINDINGS:  Severe motion degradation. Punctate diffusion restriction is  noted in the right superior parietal lobule compatible  with a lacunar  infarct of the cortex. High signal in the left cerebellar hemisphere  most likely represent artifact. Old infarcts are noted in the sruthi and  thalami. There is confluent white matter disease in the periventricular  supratentorial location. No mass effect or shift of the midline  structures. The flow void for the right petrous internal carotid artery  is absent, perhaps due to occlusion, slow flow or stenosis, the latter  of which was seen on the comparison CTA. The [place] flow voids  are preserved. Minimal mucosal thickening in the right maxillary sinus.       Impression:       1. Motion degraded study.  2. Punctate acute infarct in the superior right parietal lobule.  3. Stenosis, slow flow or occlusion of the petrous segment right  internal carotid artery.     NOTE: Discussed with ICU nurse Merissa at 8:46 AM on 01/22/2019.     D:  01/22/2019  E:  01/22/2019             CT Head Without Contrast [[id number]] Collected:  01/21/19 1836     Updated:  01/22/19 0917    Narrative:       EXAMINATION: CT HEAD WO CONTRAST- 01/21/2019     INDICATION: Stroke      TECHNIQUE: Axial CT of the head without intravenous contrast  administration     The radiation dose reduction device was turned on for each scan per the  ALARA (As Low as Reasonably Achievable) protocol.     COMPARISON: NONE     FINDINGS: Midline structures are symmetric without evidence of mass,  mass effect or midline shift. Ventricles and sulci are prominent in the  mesial temporal regions and near the vertex consistent with age-related  volume loss and generalized atrophy. Moderate amount of attenuation  changes in the periventricular and deep white matter consistent of  chronic small vessel ischemic disease. No intra-axial hemorrhage or  extra-axial fluid collection. Globes and orbits unremarkable. Visualized  paranasal sinuses demonstrate mucus retention cyst right maxillary sinus  otherwise grossly clear and well-pneumatized. Calvarium  intact.       Impression:       No acute intracranial abnormality specifically no evidence  for acute intra-axial hemorrhage or extra-axial fluid collection. No  significant large low-attenuation area to suggest large infarct. No mass  effect or midline shift with background chronic small vessel ischemic  disease evidenced by low-attenuation in the periventricular and deep  white matter.     D:  01/22/2019  E:  01/22/2019       XR Chest 1 View [[id number]] Collected:  01/21/19 1956     Updated:  01/21/19 2051    Narrative:       EXAM:    XR Chest, 1 View     EXAM DATE/TIME:    1/21/2019 7:56 PM     CLINICAL HISTORY:    83 years old, female; Signs and symptoms; Other: R/O aspiration, r pleural   effusion     TECHNIQUE:    XR of the chest, 1 view.     COMPARISON:    No relevant prior studies available.     FINDINGS:    Lungs:  Degree of lung inflation is normal. No evidence of pulmonary edema. No   focal consolidation or parenchymal lung mass.    Pleural space:  Blunted costophrenic angles suggest pleural effusions, right   more so than left. No pneumothorax.    Heart/Mediastinum:  Cardiac silhouette appears normal. No adenopathy or hilar   mass.    Bones/joints:  Osseous structures show no concerning abnormality. Bones appear   diffusely demineralized.       Impression:       Pleural effusions, right larger than left suspected, without underlying active   pulmonary edema     THIS DOCUMENT HAS BEEN ELECTRONICALLY SIGNED BY SHRADDHA GARCIA MD    CT Angiogram Head With & Without Contrast [[id number]] Collected:  01/21/19 1800     Updated:  01/21/19 1908    Narrative:       EXAM:    CT Angiography Head Without And With Contrast     EXAM DATE/TIME:    1/21/2019 6:00 PM     CLINICAL HISTORY:    83 years old, female; Signs and symptoms; Other: See notes; Additional info:   Stroke     TECHNIQUE:    Axial computed tomographic angiography images of the head without and with   intravenous contrast using CT angiography protocol.     All CT scans at this facility use at least one of these dose optimization   techniques: automated exposure control; mA and/or kV adjustment per patient   size (includes targeted exams where dose is matched to clinical indication); or   iterative reconstruction.    Coronal and sagittal reformatted images were created and reviewed.    MIP reconstructed images were created and reviewed.     CONTRAST:    75 ml of isovue 370 administered intravenously.     COMPARISON:    CT HEAD WO CONTRAST 1/21/2019 5:59 PM     FINDINGS:      Right internal carotid artery: Intracranial segment is patent with no   significant stenosis. No aneurysm.    Right anterior cerebral artery: No occlusion or significant stenosis. No   aneurysm.     Right middle cerebral artery: No occlusion or significant stenosis. No   aneurysm.     Right posterior cerebral artery: No occlusion or significant stenosis. No   aneurysm.     Right vertebral artery: No occlusion or significant stenosis. No aneurysm.       Left internal carotid artery: Intracranial segment is patent with no   significant stenosis. No aneurysm.    Left anterior cerebral artery: No occlusion or significant stenosis. No   aneurysm.     Left middle cerebral artery: No occlusion or significant stenosis. No   aneurysm.     Left posterior cerebral artery: No occlusion or significant stenosis. No   aneurysm.     Left vertebral artery: No occlusion or significant stenosis. No aneurysm.       Basilar artery: No occlusion or significant stenosis. No aneurysm.     HEAD:    Brain:  Age-related involutional changes and chronic microvascular ischemic   disease. No evidence for acute transcortical infarct. No mass effect or midline   shift. No acute intracranial hemorrhage. Basal cisterns are patent. No   extra-axial collection.    Ventricles: No ventriculomegaly.    Bones/joints: Normal. No acute fracture.    Sinuses:  Mucosal thickening involving the right maxillary sinus.    Mastoid air cells:  Normal as visualized. No mastoid effusion.    Orbits:  Bilateral cataract surgery.    Soft tissues: Normal.       Impression:       1. No significant stenosis or aneurysm.     2. No evidence for acute transcortical infarct, acute intracranial hemorrhage,   or mass effect.    THIS DOCUMENT HAS BEEN ELECTRONICALLY SIGNED BY ROSALIA LENZ MD    CT Angiogram Neck With & Without Contrast [[id number]] Collected:  01/21/19 1800     Updated:  01/21/19 1901    Narrative:       EXAM:    CT Angiography Neck Without And With Contrast     EXAM DATE/TIME:    1/21/2019 6:00 PM     CLINICAL HISTORY:    83 years old, female; Signs and symptoms; Other: See notes; Additional info:   Stroke     TECHNIQUE:    Axial computed tomographic angiography images of the neck without and with   intravenous contrast using CT angiography protocol.    All CT scans at this facility use at least one of these dose optimization   techniques: automated exposure control; mA and/or kV adjustment per patient   size (includes targeted exams where dose is matched to clinical indication); or   iterative reconstruction.    Coronal and sagittal reformatted images were created and reviewed.    MIP reconstructed images were created and reviewed.     CONTRAST:    75 ml of isovue 370 administered intravenously.     COMPARISON:    No relevant prior studies available.     FINDINGS:     VASCULATURE:    Right common carotid artery: No significant stenosis. No dissection or   occlusion.    Right internal carotid artery:  Atherosclerotic disease involving the origin   of the right internal carotid artery resulting in approximately 75-85%   stenosis. No dissection.   Right external carotid artery: No occlusion or significant stenosis.    Right vertebral artery: No significant stenosis. No dissection or occlusion.      Left common carotid artery: No significant stenosis. No dissection or   occlusion.    Left internal carotid artery: Extracranial segment is patent with no    significant stenosis. No dissection or occlusion.    Left external carotid artery: No occlusion or significant stenosis.    Left vertebral artery: No significant stenosis. No dissection or occlusion.     NECK:    Thyroid:  Left thyroid 1.1 x 0.6 cm nodule.    Bones/joints: No acute fracture.       Impression:       1. Atherosclerotic disease involving the origin of the right internal carotid   artery resulting in approximately 75-85% stenosis.     2. Left thyroid 1.1 x 0.6 cm nodule. No follow-up is necessary.     COMMENT:   Reference per NASCET criteria for degree of stenosis: Mild: <50% stenosis.   Moderate: 50-69% stenosis. Severe: 70-94% stenosis. Near occlusion: 95-99%   stenosis.    THIS DOCUMENT HAS BEEN ELECTRONICALLY SIGNED BY ROSALIA LENZ MD          Assessment:  Acute cva, right parietal, successful tpa    A fib    Plan:  ? noac plus asa, await tte    Comment:  Likely embolic         I discussed the patients findings and my recommendations with patient and nursing staff    Mahad Holland MD  01/22/19  10:00 AM

## 2019-01-22 NOTE — PROGRESS NOTES
"Critical Care Note     LOS: 1 day   Patient Care Team:  Keaton Urrutia MD as PCP - General (Family Medicine)    Chief Complaint/Reason for visit:      CVA   [place] flutter/fib  Hypertension  DVT      Subjective     83-year-old woman, resident of a nursing home rehabilitation center after a hospitalization in [place]. She developed worsening left-sided weakness, left facial droop and dysarthria. She was given TPA and brought to [facility]. CT perfusion did not reveal a large vessel clot.  Interval History:     Required [place]-Synephrine overnight to maintain her systolic blood pressure greater than 140. He is currently off  Chronic atrial fibrillation and flutter with a rate of 100  saturation 95% on 2 L  Poor IV access  Speech assessed and clinically feel she has dysphasia. She remains nothing by mouth with plans for FEES  Nursing noted right lower extremity pain. Duplex performed and she has extensive clot from the iliac vein to the ankle    Review of Systems:    All systems were reviewed and negative except as noted in subjective.    Medical history, surgical history, social history, family history reviewed    Objective     Intake/Output:    Intake/Output Summary (Last 24 hours) at 1/22/2019 1658  Last data filed at 1/22/2019 1200  Gross per 24 hour   Intake 862.87 ml   Output 650 ml   Net 212.87 ml       Nutrition:  NPO Diet    Infusions:    heparin 18 Units/kg/hr    Pharmacy to Dose Heparin     phenylephrine (BEVERLY-SYNEPHRINE) 50 mg/250 (0.2 mg/mL) infusion 0.5-3 mcg/kg/min Last Rate: Stopped (01/22/19 0738)       Telemetry: Atrial flutter             Vital Signs  Blood pressure 146/77, pulse (!) 123, temperature 98 °F (36.7 °C), temperature source Axillary, resp. rate 18, height 167.6 cm (65.98\"), weight 80.9 kg (178 lb 5.6 oz), SpO2 97 %.    Physical Exam:  General Appearance:  Lethargic elderly white woman leaning toward the left    Head:  Atraumatic    Eyes:          Pupils equal and reactive to " light    Ears:     Throat: Oral mucosa moist    Neck: Limited range of motion. Trachea midline. No thyromegaly    Back:      Lungs:   Symmetric chest expansion without wheeze or rhonchi     Heart:  Irregular rhythm, S1, S2 auscultated    Abdomen:   Bowel sounds present, soft    Rectal:   Deferred   Extremities: Right leg larger than the left, mild pitting edema    Pulses:    Skin: Warm and dry    Lymph nodes:    Neurologic: Left facial droop, mild extremity weakness with minimal drift. Speech is slurred but understandable       Results Review:     I reviewed the patient's new clinical results.   Results from last 7 days   Lab Units 01/22/19  0431 01/21/19 2051   SODIUM mmol/L 139 139   POTASSIUM mmol/L 5.0 5.0   CHLORIDE mmol/L 110* 105   CO2 mmol/L 21.0 26.0   BUN mg/dL 43* 50*   CREATININE mg/dL 1.37* 1.49*   CALCIUM mg/dL 8.9 8.3*   GLUCOSE mg/dL 102* 127*     Results from last 7 days   Lab Units 01/22/19  0431 01/21/19 2051   WBC 10*3/mm3 19.93* 20.97*   HEMOGLOBIN g/dL 13.7 13.3   HEMATOCRIT % 44.1* 42.4   PLATELETS 10*3/mm3 267 265         No results found for: BLOODCX  No results found for: URINECX    I reviewed the patient's new imaging including images and reports.     COMPARISONS:  CT head/CTA Head and neck 01/21/2019.     FINDINGS:  Severe motion degradation. Punctate diffusion restriction is  noted in the right superior parietal lobule compatible with a lacunar  infarct of the cortex. High signal in the left cerebellar hemisphere  most likely represent artifact. Old infarcts are noted in the sruthi and  thalami. There is confluent white matter disease in the periventricular  supratentorial location. No mass effect or shift of the midline  structures. The flow void for the right petrous internal carotid artery  is absent, perhaps due to occlusion, slow flow or stenosis, the latter  of which was seen on the comparison CTA. The [place] of [place] flow voids  are preserved. Minimal mucosal thickening in the  right maxillary sinus.     IMPRESSION:  1. Motion degraded study.  2. Punctate acute infarct in the superior right parietal lobule.  3. Stenosis, slow flow or occlusion of the petrous segment right  internal carotid artery.     NOTE: Discussed with ICU nurse Merissa at 8:46 AM on 01/22/2019.     D:  01/22/2019    All medications reviewed.     aspirin 325 mg Oral Daily   Or      aspirin 300 mg Rectal Daily   atorvastatin 80 mg Oral Nightly   lactated ringers 500 mL/hr Intravenous Once   nystatin  Topical Q12H   sodium chloride 3 mL Intravenous Q12H         Assessment/Plan       Stroke (CMS/HCC)    Acute deep vein thrombosis (DVT) of iliac vein (CMS/HCC), Right    Atrial flutter (CMS/HCC)    CKD (chronic kidney disease)    Chronic diastolic congestive heart failure (CMS/HCC)    Essential hypertension    H/O: CVA (with persistent left sided weakness)    Hyperlipidemia    83-year-old woman with a history of an old stroke with some minimal residual left-sided weakness. She developed worsening neurologic symptoms, received TPA and was brought to our hospital. CT perfusion revealed no large vessel occlusion and no intervention was performed. The day she was wincing with movement of her right lower extremity and was noted to have some edema. Duplex revealed extensive clot from the iliac vein down to the ankle. She failed her bedside dysphagia evaluation and there are plans for a fees tomorrow    PLAN:  Initiate heparin drip  Consider IVC filter because of the extensive clot  Plans for Eliquis at discharge  Fees  PICC line for access  Aspirin, statin  Nystatin powder for yeast dermatitis  When she is able to tolerate by mouth, reinitiate Coreg      Caitlyn Jimenez MD  01/22/19  4:58 PM      Time:30min  I personally provided care to this critically ill patient as documented above.  Critical care time does not include time spent on separately billed procedures.  Non of my critical care time was concurrent with other  critical care providers.

## 2019-01-22 NOTE — PLAN OF CARE
Problem: Patient Care Overview  Goal: Plan of Care Review  Outcome: Ongoing (interventions implemented as appropriate)   01/22/19 6635   Coping/Psychosocial   Plan of Care Reviewed With patient   Plan of Care Review   Progress improving   OTHER   Outcome Summary Pt admitted last evening for CVA s/p TPA. NIH was 16 on arrival. Stroke deficits have decreased and NIH is now 8. Pt remains dysarthric. Pt complains of pain in her back. MRI completed tonight. Will continue to monitor.        Problem: Skin Injury Risk (Adult)  Goal: Identify Related Risk Factors and Signs and Symptoms  Outcome: Outcome(s) achieved Date Met: 01/22/19    Goal: Skin Health and Integrity  Outcome: Ongoing (interventions implemented as appropriate)      Problem: Stroke (Ischemic) (Adult)  Goal: Signs and Symptoms of Listed Potential Problems Will be Absent, Minimized or Managed (Stroke)  Outcome: Outcome(s) achieved Date Met: 01/22/19      Problem: Fall Risk (Adult)  Goal: Identify Related Risk Factors and Signs and Symptoms  Outcome: Outcome(s) achieved Date Met: 01/22/19    Goal: Absence of Fall  Outcome: Ongoing (interventions implemented as appropriate)

## 2019-01-22 NOTE — THERAPY EVALUATION
Acute Care - Speech Language Pathology Initial Evaluation  [facility]   Cognitive-Communication Evaluation  Clinical Swallow Evaluation     Patient Name: Jennifer Gil  : 1935  MRN: [id number]  Today's Date: 2019  Onset of Illness/Injury or Date of Surgery: 19     Referring Physician: BEAR Osborne      Admit Date: 2019     Visit Dx:    ICD-10-CM ICD-9-CM   1. Impaired mobility and ADLs Z74.09 799.89   2. Impaired functional mobility, balance, gait, and endurance Z74.09 V49.89   3. Dysphagia, unspecified type R13.10 787.20   4. Cognitive communication deficit R41.841 799.52     Patient Active Problem List   Diagnosis   • Stroke (CMS/HCC)   • Atrial flutter (CMS/HCC)   • CKD (chronic kidney disease)   • Chronic diastolic congestive heart failure (CMS/HCC)   • Essential hypertension   • H/O: CVA (with persistent left sided weakness)   • Hyperlipidemia     Past Medical History:   Diagnosis Date   • Anxiety    • CVA (cerebral vascular accident) (CMS/HCC)    • Diastolic dysfunction    • HLD (hyperlipidemia)    • Hypertension      Past Surgical History:   Procedure Laterality Date   • PARTIAL HYSTERECTOMY          SLP EVALUATION (last 72 hours)      SLP SLC Evaluation     Row Name 19 1030                   Communication Assessment/Intervention    Document Type  evaluation  -RD        Subjective Information  complains of;weakness  -RD        Patient Observations  alert;cooperative;agree to therapy  -RD        Patient Effort  good  -RD           General Information    Patient Profile Reviewed  yes  -RD        Pertinent History Of Current Problem  Adm w/ CVA. Afib, CHF. hx of prior CVA w/ L wkns. S/p TPA @ 1609 19. Failed Rn dysphagia screen per stroke protocol.   -RD        Precautions/Limitations, Vision  vision impairment, left;other (see comments) L hemianopsia per neuro   -RD        Precautions/Limitations, Hearing  WFL;for purposes of eval  -RD        Prior Level of  Function-Communication  WFL;other (see comments) per pt/dtr  -RD        Plans/Goals Discussed with  patient;agreed upon  -RD        Barriers to Rehab  medically complex  -RD        Patient's Goals for Discharge  return to all previous roles/activities  -RD        Family Goals for Discharge  family did not state  -RD           Pain Assessment    Additional Documentation  Pain Scale: Numbers Pre/Post-Treatment (Group)  -RD           Pain Scale: Numbers Pre/Post-Treatment    Pain Scale: Numbers, Pretreatment  0/10 - no pain  -RD        Pain Scale: Numbers, Post-Treatment  0/10 - no pain  -RD           Comprehension Assessment/Intervention    Comprehension Assessment/Intervention  Auditory Comprehension  -RD           Auditory Comprehension Assessment/Intervention    Auditory Comprehension (Communication)  WFL  -RD           Expression Assessment/Intervention    Expression Assessment/Intervention  verbal expression  -RD           Verbal Expression Assessment/Intervention    Verbal Expression  WFL  -RD        Automatic Speech (Communication)  WFL  -RD        Repetition  WFL;sentences  -RD        Phrase Completion  WFL;automatic/predictable  -RD        Responsive Naming  WFL  -RD        Spontaneous/Functional Words  WFL  -RD        Sentence Formulation  WFL  -RD           Oral Musculature and Cranial Nerve Assessment    Oral Motor General Assessment  mandibular impairment;oral labial or buccal impairment;lingual impairment  -RD        Mandibular Impairment Detail, Cranial Nerve V (Trigeminal)  reduced strength on left  -RD        Oral Labial or Buccal Impairment, Detail, Cranial Nerve VII (Facial):  left labial droop;reduced ROM  -RD        Lingual Impairment, Detail. Cranial Nerves IX, XII (Glossopharyngeal and Hypoglossal)  reduced strength;reduced strength left;reduced lingual ROM  -RD           Motor Speech Assessment/Intervention    Motor Speech Function  moderate impairment  -RD        Characteristics Consistent  with Dysarthria  decreased intensity;decreased articulation;decreased breath support;slurred speech  -RD           Cognitive Assessment Intervention- SLP    Orientation Status (Cognition)  WFL;person;place;time;situation  -RD        Attention (Cognitive)  WFL  -RD        Reasoning (Cognitive)  WFL;simple  -RD        Problem Solving (Cognitive)  WFL;simple  -RD        Right Hemisphere Function  left neglect  -RD        Cognition, Comment  Continue to assess during cog/comm dx/tx  -RD           SLP Clinical Impressions    SLP Diagnosis  Moderate dysarthria impacting speech intelligiblity in sentences and conversation. Imprecise articulation, but incr'd intelligibilty at word/short phrase level. Expressive language appears WFL. Receptive language was WFL. Continue to fully assess cognition, reading/writing during dx/tx. Cognition was functional for simple tasks.   -RD        Rehab Potential/Prognosis  good  -RD        SLC Criteria for Skilled Therapy Interventions Met  yes  -RD        Functional Impact  difficulty in expressing complex messages;functional impact in social situations  -RD           Recommendations    Therapy Frequency (SLP SLC)  5 days per week  -RD        Predicted Duration Therapy Intervention (Days)  until discharge  -RD        Anticipated Dischage Disposition  inpatient rehabilitation facility;anticipate therapy at next level of care  -RD          User Key  (r) = Recorded By, (t) = Taken By, (c) = Cosigned By    Initials Name Effective Dates    RD Jacquie Knox MS CCC-SLP 04/03/18 -              EDUCATION  The patient has been educated in the following areas:     Cognitive Impairment Communication Impairment.    SLP Recommendation and Plan    SLP Diagnosis: Moderate dysarthria impacting speech intelligiblity in sentences and conversation. Imprecise articulation, but incr'd intelligibilty at word/short phrase level. Expressive language appears WFL. Receptive language was WFL. Continue to fully  assess cognition, reading/writing during dx/tx. Cognition was functional for simple tasks.     Rehab Potential/Prognosis: good    SLC Criteria for Skilled Therapy Interventions Met: yes    Anticipated Dischage Disposition: inpatient rehabilitation facility, anticipate therapy at next level of care         Therapy Frequency (Swallow): evaluation only, PRN    Predicted Duration Therapy Intervention (Days): until discharge          Plan of Care Review  Plan of Care Reviewed With: patient, daughter, family  Plan of Care Reviewed With: patient, daughter, family        SLP GOALS     Row Name 01/22/19 1030             Articulation Goal 1 (SLP)    Improve Articulation Goal 1 (SLP)  of specific sounds in phrases;of specific sounds in connected speech;by over-articulating at phrase level;by over-articulating in connected speech;80%;with minimal cues (75-90%)  -RD      Time Frame (Articulation Goal 1, SLP)  short term goal (STG);by discharge  -RD         Additional Goal 1 (SLP)    Additional Goal 1, SLP  LTG: Pt will improve cognitive-communication skills to actively participate in care w/ 100% accuracy w/o cues.   -RD      Time Frame (Additional Goal 1, SLP)  by discharge  -RD        User Key  (r) = Recorded By, (t) = Taken By, (c) = Cosigned By    Initials Name Provider Type    Jacquie Patton MS CCC-SLP Speech and Language Pathologist                  Time Calculation:     Time Calculation- SLP     Row Name 01/22/19 1401             Time Calculation- SLP    SLP Start Time  1030  -RD      SLP Received On  01/22/19  -RD        User Key  (r) = Recorded By, (t) = Taken By, (c) = Cosigned By    Initials Name Provider Type    Jacquie Patton MS CCC-SLP Speech and Language Pathologist          Therapy Charges for Today     Code Description Service Date Service Provider Modifiers Qty    08414019240  ST EVAL ORAL PHARYNG SWALLOW 3 1/22/2019 Jacquie Knox MS CCC-SLP GN 1    94536280536 HC ST EVAL SPEECH AND PROD  LEONARDO RIOS  3 2019 Jacquie Knox, MS CCC-SLP GN 1                     Jacquie Knox MS CCC-SLP  2019 and Acute Care - Speech Language Pathology   Swallow Initial Evaluation [facility]     Patient Name: Jennifer Gil  : 1935  MRN: [id number]  Today's Date: 2019  Onset of Illness/Injury or Date of Surgery: 19     Referring Physician: BEAR Osborne      Admit Date: 2019    Visit Dx:     ICD-10-CM ICD-9-CM   1. Impaired mobility and ADLs Z74.09 799.89   2. Impaired functional mobility, balance, gait, and endurance Z74.09 V49.89   3. Dysphagia, unspecified type R13.10 787.20   4. Cognitive communication deficit R41.841 799.52     Patient Active Problem List   Diagnosis   • Stroke (CMS/HCC)   • Atrial flutter (CMS/HCC)   • CKD (chronic kidney disease)   • Chronic diastolic congestive heart failure (CMS/HCC)   • Essential hypertension   • H/O: CVA (with persistent left sided weakness)   • Hyperlipidemia     Past Medical History:   Diagnosis Date   • Anxiety    • CVA (cerebral vascular accident) (CMS/HCC)    • Diastolic dysfunction    • HLD (hyperlipidemia)    • Hypertension      Past Surgical History:   Procedure Laterality Date   • PARTIAL HYSTERECTOMY          SWALLOW EVALUATION (last 72 hours)      SLP Adult Swallow Evaluation     Row Name 19 1045                   Rehab Evaluation    Document Type  evaluation  -RD        Subjective Information  complains of;weakness  -RD        Patient Observations  alert;cooperative;agree to therapy  -RD        Patient/Family Observations  Dtr present  -RD        Patient Effort  good  -RD           General Information    Patient Profile Reviewed  yes  -RD        Pertinent History Of Current Problem  Adm w/ CVA. Afib, CHF. hx of prior CVA w/ L wkns. S/p TPA @ 1609 19. Failed Rn dysphagia screen per stroke protocol.   -RD        Current Method of Nutrition  NPO  -RD        Precautions/Limitations, Vision  vision impairment, left;other  (see comments) L hemianopsia per neuro  -RD        Precautions/Limitations, Hearing  WFL;for purposes of eval  -RD        Prior Level of Function-Communication  WFL  -RD        Prior Level of Function-Swallowing  no diet consistency restrictions  -RD        Plans/Goals Discussed with  patient;family;agreed upon  -RD        Barriers to Rehab  medically complex  -RD        Patient's Goals for Discharge  return to PO diet  -RD        Family Goals for Discharge  patient able to return to PO diet  -RD           Pain Scale: Numbers Pre/Post-Treatment    Pain Scale: Numbers, Pretreatment  0/10 - no pain  -RD        Pain Scale: Numbers, Post-Treatment  0/10 - no pain  -RD           Oral Motor and Function    Dentition Assessment  upper dentures/partial in place;dentures are ill fitting  -RD        Secretion Management  problems swallowing secretions  -RD        Mucosal Quality  moist, healthy  -RD        Volitional Swallow  delayed  -RD        Volitional Cough  weak;non-productive  -RD           Oral Musculature and Cranial Nerve Assessment    Oral Motor General Assessment  mandibular impairment;oral labial or buccal impairment;lingual impairment  -RD        Mandibular Impairment Detail, Cranial Nerve V (Trigeminal)  reduced strength on left  -RD        Oral Labial or Buccal Impairment, Detail, Cranial Nerve VII (Facial):  left labial droop;reduced ROM  -RD        Lingual Impairment, Detail. Cranial Nerves IX, XII (Glossopharyngeal and Hypoglossal)  reduced strength;reduced strength left;reduced lingual ROM  -RD           General Eating/Swallowing Observations    Respiratory Support Currently in Use  nasal cannula  -RD        Eating/Swallowing Skills  fed by SLP  -RD        Positioning During Eating  upright 90 degree;upright in chair  -RD        Utensils Used  spoon;cup;straw  -RD        Consistencies Trialed  thin liquids;nectar/syrup-thick liquids;pureed  -RD        Pre SpO2 (%)  96  -RD        Post SpO2 (%)  83  -RD            Respiratory    Respiratory Status  increase in respiratory rate;reduction in SpO2;during swallowing/eating  -RD           Clinical Swallow Eval    Oral Prep Phase  impaired  -RD        Oral Transit  impaired  -RD        Oral Residue  impaired  -RD        Pharyngeal Phase  suspected pharyngeal impairment  -RD        Clinical Swallow Evaluation Summary  Clinical dysphagia eval complete. Prolonged manipulation and oral residue suctioned via yanker w/ all consistencies. Anterior loss on L side w/ thins, nectar, and pureed. Pt w/ decr'd oral strength and ROM, wkns > on L. Overt s/s of aspiration c/b coughing w/ thin liquids, multiple swallows and inconsistent wet vocal quality w/ nectar-thick and pureed. Need to r/o pharyngeal dysphagia. Pt still not outside of TPA window, so will plan for FEES tomorrow. Safest NPO, meds alt route until FEES in AM 1/23/19  -RD           Oral Prep Concerns    Oral Prep Concerns  increased prep time;incomplete or weak lip closure around spoon;anterior loss  -RD        Incomplete or Weak Lip Closure Around Spoon  all consistencies  -RD        Anterior Loss  thin;nectar;pudding  -RD        Increased Prep Time  nectar;pudding  -RD           Oral Transit Concerns    Oral Transit Concerns  increased oral transit time  -RD        Increased Oral Transit Time  all consistencies  -RD           Oral Residue Concerns    Oral Residue Concerns  diffuse residue throughout oral cavity  -RD        Diffuse Residue Throughout Oral Cavity  thin;nectar;pudding  -RD        Oral Residue Concerns, Comment  Oral residue suctioned via yanker or lost anteriorly on L side  -RD           Pharyngeal Phase Concerns    Pharyngeal Phase Concerns  multiple swallows;cough;wet vocal quality  -RD        Wet Vocal Quality  nectar;pudding  -RD        Multiple Swallows  nectar;pudding  -RD        Cough  thin  -RD        Pharyngeal Phase Concerns, Comment  Need to r/o pharyngeal dysphagia. Plan for FEES in AM when outside  of TPA window  -RD           Clinical Impression    SLP Swallowing Diagnosis  oral dysfunction;suspected pharyngeal dysfunction  -RD        Functional Impact  risk of aspiration/pneumonia;risk of malnutrition;risk of dehydration  -RD        Rehab Potential/Prognosis, Swallowing  adequate, monitor progress closely  -RD        Swallow Criteria for Skilled Therapeutic Interventions Met  demonstrates skilled criteria  -RD           Recommendations    Therapy Frequency (Swallow)  evaluation only;PRN  -RD        Predicted Duration Therapy Intervention (Days)  until discharge  -RD        SLP Diet Recommendation  NPO;temporary alternate methods of nutrition/hydration  -RD        Recommended Diagnostics  FEES;other (see comments) 1/23/19, when outside of TPA window  -RD        Recommended Precautions and Strategies  other (see comments) Oral care BID/PRN  -RD        SLP Rec. for Method of Medication Administration  meds via alternate route  -RD        Anticipated Dischage Disposition  inpatient rehabilitation facility;anticipate therapy at next level of care  -RD          User Key  (r) = Recorded By, (t) = Taken By, (c) = Cosigned By    Initials Name Effective Dates    Jacquie Patton MS CCC-SLP 04/03/18 -           EDUCATION  The patient has been educated in the following areas:   Dysphagia (Swallowing Impairment) Oral Care/Hydration NPO rationale.    SLP Recommendation and Plan  SLP Swallowing Diagnosis: oral dysfunction, suspected pharyngeal dysfunction  SLP Diet Recommendation: NPO, temporary alternate methods of nutrition/hydration  Recommended Precautions and Strategies: other (see comments)(Oral care BID/PRN)        Recommended Diagnostics: FEES, other (see comments)(1/23/19, when outside of TPA window)  Swallow Criteria for Skilled Therapeutic Interventions Met: demonstrates skilled criteria  Anticipated Dischage Disposition: inpatient rehabilitation facility, anticipate therapy at next level of care  Rehab  Potential/Prognosis, Swallowing: adequate, monitor progress closely  Therapy Frequency (Swallow): evaluation only, PRN  Predicted Duration Therapy Intervention (Days): until discharge       Plan of Care Reviewed With: patient, daughter, family  Plan of Care Review  Plan of Care Reviewed With: patient, daughter, family    SLP GOALS     Row Name 01/22/19 1030             Articulation Goal 1 (SLP)    Improve Articulation Goal 1 (SLP)  of specific sounds in phrases;of specific sounds in connected speech;by over-articulating at phrase level;by over-articulating in connected speech;80%;with minimal cues (75-90%)  -RD      Time Frame (Articulation Goal 1, SLP)  short term goal (STG);by discharge  -RD         Additional Goal 1 (SLP)    Additional Goal 1, SLP  LTG: Pt will improve cognitive-communication skills to actively participate in care w/ 100% accuracy w/o cues.   -RD      Time Frame (Additional Goal 1, SLP)  by discharge  -RD        User Key  (r) = Recorded By, (t) = Taken By, (c) = Cosigned By    Initials Name Provider Type    Jacquie Patton MS CCC-SLP Speech and Language Pathologist             Time Calculation:   Time Calculation- SLP     Row Name 01/22/19 1401             Time Calculation- SLP    SLP Start Time  1030  -RD      SLP Received On  01/22/19  -RD        User Key  (r) = Recorded By, (t) = Taken By, (c) = Cosigned By    Initials Name Provider Type    Jacquie Patton MS CCC-SLP Speech and Language Pathologist          Therapy Charges for Today     Code Description Service Date Service Provider Modifiers Qty    50025964328 HC ST EVAL ORAL PHARYNG SWALLOW 3 1/22/2019 aJcquie Knox MS CCC-SLP GN 1    [id number] HC ST EVAL SPEECH AND PROD W LANG  3 1/22/2019 Jacquie Knox MS CCC-JOHANNY GN 1               MS REBECCA Wilson  1/22/2019

## 2019-01-22 NOTE — H&P
CRITICAL CARE ADMISSION NOTE    Chief Complaint     Stroke (CMS/[facility])    History of Present Illness     Per Stroke Navigator HPI:  Jennifer Gil is a 83 y.o.  female on whom I am evaluating as a Code 19 for a possible acute stroke.  Mrs. Gil is a resident at [facility] where she is currently receiving inpatient rehabilitation after a recent admission for UTI and pleural effusions.  Her granddaughter was visiting her and states that she was in her normal state of health at approximately 1200.  At 1415 staff noted that she had left sided facial droop, left sided weakness (worse than baseline), and dysarthria.  She was transported to [facility] where she had a CT head which was negative for hemorrhage and/or acute process.  She met criteria for IV alteplase which was discussed with her daughter who agreed to proceed with administration.  Dr. Alvares was contacted by who accepted her for transfer.  He asked [facility] to contact the interventional team to determine if she may be a candidate for neurological intervention.  It is noted that the patient has had a prior CVA with mild left sided residual weakness (per report she ambulates without assistance and takes care of herself.)    Additional HPI:  The patient has been admitted to the intensive care unit.  She received TPA at the outside hospital.  CT perfusion reportedly shows no large vessel occlusion.  The family indicates that there has been no neurologic improvement since she has received a TPA.  She is currently lethargic and doesn't respond to questions appropriately for me    Apparently has had confusion and choking on eating the last few days    Problem List, Surgical History, Family, Social History, and ROS     Patient Active Problem List   Diagnosis   • Stroke (CMS/[facility])   • Atrial flutter (CMS/[facility])   • CKD (chronic kidney disease)   • Chronic diastolic congestive heart failure (CMS/[facility])   • Essential  hypertension   • H/O: CVA (with persistent left sided weakness)   • Hyperlipidemia     Past Surgical History:   Procedure Laterality Date   • PARTIAL HYSTERECTOMY         Allergies   Allergen Reactions   • Keflex [Cephalexin] Anaphylaxis   • Penicillins Anaphylaxis     No current facility-administered medications on file prior to encounter.      No current outpatient medications on file prior to encounter.     MEDICATION LIST AND ALLERGIES REVIEWED.    Family History   Problem Relation Age of Onset   • Coronary artery disease Mother    • Coronary artery disease Father    • Heart failure Brother    • Dementia Brother      Social History     Tobacco Use   • Smoking status: Not on file   Substance Use Topics   • Alcohol use: Not on file   • Drug use: Not on file     Social History     Social History Narrative    Retired nurse     FAMILY AND SOCIAL HISTORY REVIEWED.    Review of Systems  AS ABOVE OR ALL OTHER SYSTEMS REVIEWED AND ARE NEGATIVE.    Physical Exam and Clinical Information   /74   Pulse 101   Resp 20   Wt 85 kg (187 lb 6.3 oz)   SpO2 95%   Physical Exam:   GENERAL: Lethargic, no overt distress   HEENT: No external trauma.  No adenopathy   LUNGS: A few rhonchi.  Tachypnea but no overt respiratory distress   HEART: Irregular rate and rhythm with distant heart sounds   GI: Soft, quiet   EXTREMITIES: Trace edema.  Trace palpable pulses.  No cyanosis   NEURO/PSYCH: Poorly responsive for me.  Doesn't follow commands.  Will open eyes to stimulation.  Total NIH stroke scale of 15              Invalid input(s): CHLORIDE  CrCl cannot be calculated (No order found.).          No results found for: LACTATE     IMAGES: CT head shows no acute intracranial abnormality.    CXR reveals Right > Left pleural effusion with cardiomegaly.    I reviewed the patient's results and images.     Assesment     Active Hospital Problems    Diagnosis   • **Stroke (CMS/HCC)   • Atrial flutter (CMS/HCC)   • CKD (chronic kidney  disease)   • Chronic diastolic congestive heart failure (CMS/HCC)   • Essential hypertension   • H/O: CVA (with persistent left sided weakness)   • Hyperlipidemia     Plan/Recommendations     Admit to ICU  Post tPA CVA order set  Dysphagia evaluation - I suspect she will fail  Will probably need to broach the subject of TF with family  Dual anti-platelet therapy per Dr. Reynoso    Discussed resuscitative status with patients family; they are clear that she would not want extensive measures in the event of a cardiopulmonary arrest.     Critical Care time spent in direct patient care: 40 minutes (excluding procedure time, if applicable) including high complexity decision making to assess, manipulate, and support vital organ system failure in this individual who has impairment of one or more vital organ systems such that there is a high probability of imminent or life threatening deterioration in the patient’s condition.    NAGA Hutchison MD  Pulmonary and Critical Care Medicine     CC: Keaton Urrutia MD

## 2019-01-22 NOTE — CONSULTS
NAME: MARLEN DIAS  : 1935  PCP: Keaton Urrutia MD  Attending MD: Bruno Hutchison, *    Date of Admission:  2019  Date of Service: 2019    History of Present Illness:  83 y.o.  female who presented to [facility] in [place] with slurred speech and left-sided hemiparesis.  She was administered IV TPA therapy in [place], and transferred to [facility] for higher level care and see expected large vessel occlusion stroke.  Her NIHSS fluctuated between 12-16 between [place] and [facility].  Upon arrival to [facility], and adequate IV could not be obtained for CT perfusion scan, but she was able to go CT angiography which demonstrated no evidence of an intracranial large vessel occlusion.  CTA didn't demonstrate bulky calcific plaque involving the right internal carotid artery, and extending into the high cervical region, resulting in a critical (greater than 90%) stenosis.    Given that she did not have an intracranial large vessel occlusion, she was deemed not a candidate for emergent neuro intervention.  While she is able to ambulate unassisted, she has resided in a nursing home for the past 3 years secondary to prior strokes.  She has a history of atrial fibrillation, and this is been treated with only aspirin therapy previously.    Past Medical History:  Past Medical History:   Diagnosis Date   • Anxiety    • CVA (cerebral vascular accident) (CMS/HCC)    • Diastolic dysfunction    • HLD (hyperlipidemia)    • Hypertension        Past Surgical History:  Past Surgical History:   Procedure Laterality Date   • PARTIAL HYSTERECTOMY           Medications  Medications Prior to Admission   Medication Sig Dispense Refill Last Dose   • aspirin 81 MG chewable tablet Chew 81 mg Daily.      • carvedilol (COREG) 12.5 MG tablet Take 12.5 mg by mouth 2 (Two) Times a Day With Meals.      • clonazePAM (KlonoPIN) 1 MG tablet Take 1 mg by mouth 2 (Two)  Times a Day As Needed for Seizures.      • furosemide (LASIX) 40 MG tablet Take 40 mg by mouth 2 (Two) Times a Day.      • HYDROcodone-acetaminophen (NORCO) 7.5-325 MG per tablet Take 1 tablet by mouth Every 4 (Four) Hours As Needed for Moderate Pain .      • ramipril (ALTACE) 2.5 MG capsule Take 2.5 mg by mouth Daily.          Allergies:  Allergies   Allergen Reactions   • Keflex [Cephalexin] Anaphylaxis   • Penicillins Anaphylaxis       Social Hx:  Social History     Socioeconomic History   • Marital status:      Spouse name: Not on file   • Number of children: Not on file   • Years of education: Not on file   • Highest education level: Not on file   Social Needs   • Financial resource strain: Not on file   • Food insecurity - worry: Not on file   • Food insecurity - inability: Not on file   • Transportation needs - medical: Not on file   • Transportation needs - non-medical: Not on file   Occupational History   • Occupation: nurse   Tobacco Use   • Smoking status: Former Smoker     Types: Cigarettes     Last attempt to quit: 10/1/2015     Years since quitting: 3.3   • Smokeless tobacco: Never Used   Substance and Sexual Activity   • Alcohol use: Not on file   • Drug use: Not on file   • Sexual activity: Not on file   Other Topics Concern   • Not on file   Social History Narrative    Retired nurse       Family Hx:  Family History   Problem Relation Age of Onset   • Coronary artery disease Mother    • Coronary artery disease Father    • Heart failure Brother    • Dementia Brother        Review of Imaging:  CT angiogram again demonstrates no evidence of an intracranial large vessel occlusion, but a greater than 90% stenosis involving the right internal carotid origin.  MRI of the head demonstrates a few punctate/small thromboembolic infarcts in the right MCA vascular territory.    Laboratory Result:  Lab Results   Component Value Date    WBC 19.93 (H) 01/22/2019    HGB 13.7 01/22/2019    HCT 44.1 (H)  01/22/2019    MCV 92.1 01/22/2019     01/22/2019     Lab Results   Component Value Date    GLUCOSE 102 (H) 01/22/2019    CALCIUM 8.9 01/22/2019     01/22/2019    K 5.0 01/22/2019    CO2 21.0 01/22/2019     (H) 01/22/2019    BUN 43 (H) 01/22/2019    CREATININE 1.37 (H) 01/22/2019    EGFRIFNONA 37 (L) 01/22/2019    BCR 31.4 (H) 01/22/2019    ANIONGAP 8.0 01/22/2019     Lab Results   Component Value Date    HGBA1C 6.50 (H) 01/22/2019     Lab Results   Component Value Date    CHOL 85 01/22/2019    TRIG 79 01/22/2019    HDL 35 (L) 01/22/2019    LDL 37 01/22/2019       Physical Examination:  Vitals:    01/22/19 1000   BP: (!) 130/102   Pulse: (!) 125   Resp: 18   Temp:    SpO2: 91%        General Appearance:   Well developed, well nourished, well groomed, alert, and cooperative.  Cardiovascular: Regular rate and rhythm. No carotid bruits    Neurological examination:  She is alert and resting comfortably in the bedside chair.  She's regained antigravity strength in the left upper and lower extremities.  She still has a fairly profound left facial droop and mild dysarthria, but she is not aphasic.      Diagnoses/Plan:    Ms. Gil is a 83 y.o. female who presented to [facility] as a transfer for acute ischemic stroke, status post IV TPA therapy at outside hospital.  Upon arrival to [facility], CT angiography demonstrated no evidence of an intracranial large vessel occlusion, and thus she was not a candidate for emergent neuro intervention.  CT angiography did demonstrate a critical (greater than 90%) stenosis involving her right internal carotid artery origin.    She does have an history of atrial fibrillation, but the etiology of her stroke is almost assuredly thromboembolic related to her cervical right carotid disease.  I had a lengthy discussion with the patient/family in regards to treatment options for her carotid disease, and given her extensive comorbidities, baseline  function, and likely need for anticoagulation for her atrial fibrillation, they wish to pursue medical management at this point (which is certainly reasonable).  Given her atrial fibrillation, think an Eliquis/aspirin regimen would be reasonable.  I instructed the family that if they wish to pursue carotid in intervention in the future, that I be happy to see her again for further consultation.

## 2019-01-22 NOTE — PLAN OF CARE
Problem: Patient Care Overview  Goal: Plan of Care Review  Outcome: Ongoing (interventions implemented as appropriate)   01/22/19 1030  1045   Coping/Psychosocial   Plan of Care Reviewed With patient;daughter;family   SLP cognitive-communication and clinical dysphagia evaluations completed. Will address suspected pharyngeal dysphagia w/ NPO, meds alt route until FEES tomorrow (when outside of TPA window). Will address communication deficit during cog/comm dx/tx. Please see note for further details and recommendations.      Problem: Stroke (Ischemic) (Adult)  Goal: Signs and Symptoms of Listed Potential Problems Will be Absent, Minimized or Managed (Stroke)  Outcome: Ongoing (interventions implemented as appropriate)   01/22/19 1030                 1045   Goal/Outcome Evaluation   Problems Assessed (Stroke (Ischemic)) cognitive impairment;communication impairment;eating/swallowing impairment   Problems Assessed (Stroke (Ischemic)) cognitive impairment;communication impairment;eating/swallowing impairment

## 2019-01-22 NOTE — THERAPY EVALUATION
Acute Care - Occupational Therapy Initial Evaluation  [facility]     Patient Name: Jennifer Gil  : 1935  MRN: [id number]  Today's Date: 2019  Onset of Illness/Injury or Date of Surgery: 19  Date of Referral to OT: 19  Referring Physician: BEAR Osborne    Admit Date: 2019       ICD-10-CM ICD-9-CM   1. Impaired mobility and ADLs Z74.09 799.89     Patient Active Problem List   Diagnosis   • Stroke (CMS/HCC)   • Atrial flutter (CMS/HCC)   • CKD (chronic kidney disease)   • Chronic diastolic congestive heart failure (CMS/HCC)   • Essential hypertension   • H/O: CVA (with persistent left sided weakness)   • Hyperlipidemia     Past Medical History:   Diagnosis Date   • Anxiety    • CVA (cerebral vascular accident) (CMS/HCC)    • Diastolic dysfunction    • HLD (hyperlipidemia)    • Hypertension      Past Surgical History:   Procedure Laterality Date   • PARTIAL HYSTERECTOMY            OT ASSESSMENT FLOWSHEET (last 72 hours)      Occupational Therapy Evaluation     Row Name 19 0805                   OT Evaluation Time/Intention    Subjective Information  no complaints  -CL        Document Type  evaluation  -CL        Mode of Treatment  occupational therapy  -CL        Patient Effort  good  -CL        Symptoms Noted During/After Treatment  none  -CL           General Information    Patient Profile Reviewed?  yes  -CL        Onset of Illness/Injury or Date of Surgery  19  -CL        Referring Physician  BEAR Osborne  -CL        Prior Level of Function  mod assist:;max assist:;all household mobility;transfer;ADL's;bathing;dressing Pt questionnable historian, no family present  -CL        Equipment Currently Used at Home  walker, rolling Pt questionnable historian, no family present  -CL        Pertinent History of Current Functional Problem  Pt presented to the ED 2/2 to L sided weakness and dysarthria. CT head and CTP (-), pt s/p tPA. CTA (+) ILIR stenosis. PMH: CVA w/ residual L  sided deficits.   -CL        Existing Precautions/Restrictions  fall;oxygen therapy device and L/min;other (see comments) L sided weakness  -CL        Limitations/Impairments  safety/cognitive  -CL        Risks Reviewed  patient:;LOB;nausea/vomiting;dizziness;increased discomfort;change in vital signs;lines disloged  -CL        Benefits Reviewed  patient:;improve function;increase independence;increase balance;increase strength;decrease pain;increase knowledge  -CL        Barriers to Rehab  medically complex;previous functional deficit  -CL           Relationship/Environment    Lives With  facility resident  -CL           Resource/Environmental Concerns    Current Living Arrangements  residential facility Pt questionnable historian, no family present  -CL           Cognitive Assessment/Interventions    Additional Documentation  Cognitive Assessment/Intervention (Group)  -CL           Cognitive Assessment/Intervention- PT/OT    Affect/Mental Status (Cognitive)  confused  -CL        Orientation Status (Cognition)  oriented to;person;place;time dysarthria  -CL        Follows Commands (Cognition)  follows one step commands;verbal cues/prompting required;repetition of directions required;75-90% accuracy  -CL        Cognitive Function (Cognitive)  safety deficit  -CL        Safety Deficit (Cognitive)  mild deficit;awareness of need for assistance;safety precautions awareness  -CL        Personal Safety Interventions  fall prevention program maintained;gait belt;nonskid shoes/slippers when out of bed  -CL           Bed Mobility Assessment/Treatment    Bed Mobility Assessment/Treatment  supine-sit  -CL        Supine-Sit [place] (Bed Mobility)  moderate assist (50% patient effort);verbal cues  -CL        Assistive Device (Bed Mobility)  bed rails;head of bed elevated  -CL           Transfer Assessment/Treatment    Comment (Transfers)  Defer to PT.   -CL           General ROM    GENERAL ROM COMMENTS  BUE grossly WFL.    -CL           MMT (Manual Muscle Testing)    General MMT Comments  RUE grossly 3+/5, LUE shldr FE 3-/5,  3/5, remainder grossly 3+/5.   -CL           Motor Assessment/Interventions    Additional Documentation  Balance (Group);Gross Motor Coordination (Group);Therapeutic Exercise (Group)  -CL           Therapeutic Exercise    41659 - OT Therapeutic Activity Minutes  8  -CL           Gross Motor Coordination    Gross Motor Impairments  finger to nose  -CL        Gross Motor Skill, Impairments Detail  LUE mildly impaired.   -CL           Balance    Balance  static sitting balance  -CL           Static Sitting Balance    Level of [place] (Unsupported Sitting, Static Balance)  moderate assist, 50 to 74% patient effort  -CL        Sitting Position (Unsupported Sitting, Static Balance)  sitting on edge of bed  -CL        Time Able to Maintain Position (Unsupported Sitting, Static Balance)  more than 5 minutes  -CL        Comment (Unsupported Sitting, Static Balance)  Pt progressed to CGA, initial L lateral LOB  -CL           Sensory Assessment/Intervention    Sensory General Assessment  no sensation deficits identified  -CL        Additional Documentation  Vision Assessment/Intervention (Group)  -CL           Vision Assessment/Intervention    Visual Impairment/Limitations  -- tracking WFL  -CL           Positioning and Restraints    Pre-Treatment Position  in bed  -CL        Post Treatment Position  bed  -CL        In Bed  notified nsg;sitting EOB;call light within reach;encouraged to call for assist;with PT  -CL           Clinical Impression (OT)    Date of Referral to OT  01/21/19  -CL        OT Diagnosis  Decreased independence in ADLs.   -CL        Patient/Family Goals Statement (OT Eval)  Return to PLOF.   -CL        Criteria for Skilled Therapeutic Interventions Met (OT Eval)  yes;treatment indicated  -CL        Rehab Potential (OT Eval)  good, to achieve stated therapy goals  -CL        Therapy Frequency  (OT Eval)  daily  -CL        Anticipated Equipment Needs at Discharge (OT)  -- TBA further  -CL        Anticipated Discharge Disposition (OT)  skilled nursing facility  -CL           Vital Signs    Pre Systolic BP Rehab  101  -CL        Pre Treatment Diastolic BP  48  -CL        Post Systolic BP Rehab  -- w/ PT  -CL        Pretreatment Heart Rate (beats/min)  96  -CL        Pre SpO2 (%)  96  -CL        O2 Delivery Pre Treatment  supplemental O2  -CL        Pre Patient Position  Supine  -CL        Intra Patient Position  Sitting  -CL        Post Patient Position  Sitting  -CL           OT Goals    Transfer Goal Selection (OT)  transfer, OT goal 1  -CL        Dressing Goal Selection (OT)  dressing, OT goal 1  -CL        Grooming Goal Selection (OT)  grooming, OT goal 1  -CL        Balance Goal Selection (OT)  balance, OT goal 1  -CL        Additional Documentation  Balance Goal Selection (OT) (Row);Grooming Goal Selection (OT) (Row)  -CL           Transfer Goal 1 (OT)    Activity/Assistive Device (Transfer Goal 1, OT)  sit-to-stand/stand-to-sit;toilet  -CL        [place] Level/Cues Needed (Transfer Goal 1, OT)  moderate assist (50-74% patient effort);verbal cues required  -CL        Time Frame (Transfer Goal 1, OT)  by discharge;long term goal (LTG)  -CL        Progress/Outcome (Transfer Goal 1, OT)  goal ongoing  -CL           Dressing Goal 1 (OT)    Activity/Assistive Device (Dressing Goal 1, OT)  upper body dressing utilizing meredith technique  -CL        [place]/Cues Needed (Dressing Goal 1, OT)  moderate assist (50-74% patient effort);verbal cues required  -CL        Time Frame (Dressing Goal 1, OT)  by discharge;long term goal (LTG)  -CL        Progress/Outcome (Dressing Goal 1, OT)  goal ongoing  -CL           Grooming Goal 1 (OT)    Activity/Device (Grooming Goal 1, OT)  wash face, hands;hair care seated EOB  -CL        [place] (Grooming Goal 1, OT)  standby assist;verbal cues required  -CL         Time Frame (Grooming Goal 1, OT)  by discharge;long term goal (LTG)  -CL        Progress/Outcome (Grooming Goal 1, OT)  goal ongoing  -CL           Balance Goal 1 (OT)    Activity/Assistive Device (Balance Goal 1, OT)  sitting, dynamic  -CL        [place] Level/Cues Needed (Balance Goal 1, OT)  contact guard assist;verbal cues required  -CL        Time Frame (Balance Goal 1, OT)  by discharge;long term goal (LTG)  -CL        Progress/Outcomes (Balance Goal 1, OT)  goal ongoing  -CL          User Key  (r) = Recorded By, (t) = Taken By, (c) = Cosigned By    Initials Name Effective Dates    CL Cherie Cabello, OT 04/03/18 -          Occupational Therapy Education     Title: PT OT SLP Therapies (In Progress)     Topic: Occupational Therapy (In Progress)     Point: ADL training (In Progress)     Description: Instruct learner(s) on proper safety adaptation and remediation techniques during self care or transfers.   Instruct in proper use of assistive devices.    Learning Progress Summary           Patient Acceptance, E, NR by CL at 1/22/2019  8:57 AM    Comment:  Pt educated on appropriate safety precautions, t/f techniques, role of OT, and benefits of therapy.                   Point: Precautions (In Progress)     Description: Instruct learner(s) on prescribed precautions during self-care and functional transfers.    Learning Progress Summary           Patient Acceptance, E, NR by CL at 1/22/2019  8:57 AM    Comment:  Pt educated on appropriate safety precautions, t/f techniques, role of OT, and benefits of therapy.                   Point: Body mechanics (In Progress)     Description: Instruct learner(s) on proper positioning and spine alignment during self-care, functional mobility activities and/or exercises.    Learning Progress Summary           Patient Acceptance, E, NR by CL at 1/22/2019  8:57 AM    Comment:  Pt educated on appropriate safety precautions, t/f techniques, role of OT, and benefits of therapy.                                User Key     Initials Effective Dates Name Provider Type Discipline    CL 04/03/18 -  Cherie Cabello OT Occupational Therapist OT                  OT Recommendation and Plan  Outcome Summary/Treatment Plan (OT)  Anticipated Equipment Needs at Discharge (OT): (TBA further)  Anticipated Discharge Disposition (OT): skilled nursing facility  Therapy Frequency (OT Eval): daily  Plan of Care Review  Plan of Care Reviewed With: patient  Plan of Care Reviewed With: patient  Outcome Summary: OT completed a brief chart review relating to presenting physical/cognitive deficits. Pt Mod Ax1 for bed mobility and progressed static sitting balance to CGA. Pt limited d/t L sided weakness and dysarthria. Recommend cont skilled IPOT POC. Recommend pt DC to SNF.     Outcome Measures     Row Name 01/22/19 0805             How much help from another is currently needed...    Putting on and taking off regular lower body clothing?  1  -CL      Bathing (including washing, rinsing, and drying)  1  -CL      Toileting (which includes using toilet bed pan or urinal)  1  -CL      Putting on and taking off regular upper body clothing  2  -CL      Taking care of personal grooming (such as brushing teeth)  2  -CL      Eating meals  2  -CL      Score  9  -CL         Modified [place] Scale    Modified [place] Scale  4 - Moderately severe disability.  Unable to walk without assistance, and unable to attend to own bodily needs without assistance.  -CL         Functional Assessment    Outcome Measure Options  AM-PAC 6 Clicks Daily Activity (OT);Modified [place]  -CL        User Key  (r) = Recorded By, (t) = Taken By, (c) = Cosigned By    Initials Name Provider Type    CL Cherie Cabello OT Occupational Therapist          Time Calculation:   Time Calculation- OT     Row Name 01/22/19 0805             Time Calculation- OT    OT Start Time  0805  -CL      OT Received On  01/22/19  -CL      OT Goal Re-Cert Due Date  02/01/19  -CL          Timed Charges    05720 - OT Therapeutic Activity Minutes  8  -CL        User Key  (r) = Recorded By, (t) = Taken By, (c) = Cosigned By    Initials Name Provider Type    CL Cherie Cabello OT Occupational Therapist        Therapy Suggested Charges     Code   Minutes Charges    26625 (CPT®) Hc Ot Neuromusc Re Education Ea 15 Min      64705 (CPT®) Hc Ot Ther Proc Ea 15 Min      28956 (CPT®) Hc Ot Therapeutic Act Ea 15 Min 8 1    64630 (CPT®) Hc Ot Manual Therapy Ea 15 Min      32918 (CPT®) Hc Ot Iontophoresis Ea 15 Min      32911 (CPT®) Hc Ot Elec Stim Ea-Per 15 Min      41968 (CPT®) Hc Ot Ultrasound Ea 15 Min      48063 (CPT®) Hc Ot Self Care/Mgmt/Train Ea 15 Min      Total  8 1        Therapy Charges for Today     Code Description Service Date Service Provider Modifiers Qty    54653680107 HC OT THERAPEUTIC ACT EA 15 MIN 1/22/2019 Cherie Cabello OT GO 1    27263317935 HC OT EVAL LOW COMPLEXITY 3 1/22/2019 Cherie Cabello OT GO 1    83090612970 HC OT THER SUPP EA 15 MIN 1/22/2019 Cherie Cabello OT GO 1               Cherie Cabello OT  1/22/2019

## 2019-01-22 NOTE — CONSULTS
Neurology Note    Patient:  Jennifer Gil    YOB: 1935    REFERRING PHYSICIAN:  Valdo Corbett*    CHIEF COMPLAINT:    Left sided weakness    HISTORY OF PRESENT ILLNESS:   The patient is a 83 y.o. female with h/o prior stroke about 20 years ago, mild to no residual left sided weakness per family, chronic Afib/flutter, on aspirin with sudden onset of left sided weakness around noon today, taken from her rehab facility to [facility] ED where NIHSS was 15, head CT was negative, , in Afib, received TPA around 4 pm, and NS bolus. Transported here by air, NIH still at 15, CTA head negative, CTA neck 75-80% ILIR stenosis vs hypoplasia, personally reviewed, repeat head CT negative. Dr. Reynoso notified and reviewed imaging per stroke coordinator.    Past Medical History:  Past Medical History:   Diagnosis Date   • Anxiety    • CVA (cerebral vascular accident) (CMS/HCC)    • Diastolic dysfunction    • HLD (hyperlipidemia)    • Hypertension        Past Surgical History:  Past Surgical History:   Procedure Laterality Date   • PARTIAL HYSTERECTOMY         Social History:   Social History     Socioeconomic History   • Marital status:      Spouse name: Not on file   • Number of children: Not on file   • Years of education: Not on file   • Highest education level: Not on file   Occupational History   • Occupation: nurse   Social History Narrative    Retired nurse        Family History:   Family History   Problem Relation Age of Onset   • Coronary artery disease Mother    • Coronary artery disease Father    • Heart failure Brother    • Dementia Brother        Medications Prior to Admission:    Prior to Admission medications    Not on File       Allergies:  Keflex [cephalexin] and Penicillins      Review of system  Review of Systems   Unable to perform ROS: Mental status change       Vitals:    01/21/19 2130   BP: 143/79   Pulse: 95   Resp:    Temp:    SpO2: 97%       Physical exam  Physical Exam    Constitutional: She appears well-developed and well-nourished.   HENT:   Head: Normocephalic and atraumatic.   Neck: Carotid bruit is not present.   Cardiovascular: An irregularly irregular rhythm present.   Pulmonary/Chest: Effort normal.   Neurological:   Alerts to voice, oriented to name and hospital, gaze midline, some left visual and sensory neglect, counts fingers on the right, severe left facial droop, speech dysarthric, tongue midline, raises left arm against gravity with effort, right arm 5/5, does not lift legs but moves both without gravity, DTRs hypoactive, question of left Babinski.         Lab Results   Component Value Date    WBC 20.97 (H) 01/21/2019    HGB 13.3 01/21/2019    HCT 42.4 01/21/2019    MCV 93.4 01/21/2019     01/21/2019     Lab Results   Component Value Date    GLUCOSE 127 (H) 01/21/2019    BUN 50 (H) 01/21/2019    CREATININE 1.49 (H) 01/21/2019    EGFRIFNONA 34 (L) 01/21/2019    BCR 33.6 (H) 01/21/2019    CO2 26.0 01/21/2019    CALCIUM 8.3 (L) 01/21/2019         Radiological Studies:    Ct Angiogram Head With & Without Contrast    Result Date: 1/21/2019  EXAM:  CT Angiography Head Without And With Contrast EXAM DATE/TIME:  1/21/2019 6:00 PM CLINICAL HISTORY:  83 years old, female; Signs and symptoms; Other: See notes; Additional info: Stroke TECHNIQUE:  Axial computed tomographic angiography images of the head without and with intravenous contrast using CT angiography protocol.  All CT scans at this facility use at least one of these dose optimization techniques: automated exposure control; mA and/or kV adjustment per patient size (includes targeted exams where dose is matched to clinical indication); or iterative reconstruction.  Coronal and sagittal reformatted images were created and reviewed.  MIP reconstructed images were created and reviewed. CONTRAST:  75 ml of isovue 370 administered intravenously. COMPARISON:  CT HEAD WO CONTRAST 1/21/2019 5:59 PM FINDINGS:  Right  internal carotid artery: Intracranial segment is patent with no significant stenosis. No aneurysm.  Right anterior cerebral artery: No occlusion or significant stenosis. No aneurysm.   Right middle cerebral artery: No occlusion or significant stenosis. No aneurysm.   Right posterior cerebral artery: No occlusion or significant stenosis. No aneurysm.   Right vertebral artery: No occlusion or significant stenosis. No aneurysm.   Left internal carotid artery: Intracranial segment is patent with no significant stenosis. No aneurysm.  Left anterior cerebral artery: No occlusion or significant stenosis. No aneurysm.   Left middle cerebral artery: No occlusion or significant stenosis. No aneurysm.   Left posterior cerebral artery: No occlusion or significant stenosis. No aneurysm.   Left vertebral artery: No occlusion or significant stenosis. No aneurysm.   Basilar artery: No occlusion or significant stenosis. No aneurysm. HEAD:  Brain:  Age-related involutional changes and chronic microvascular ischemic disease. No evidence for acute transcortical infarct. No mass effect or midline shift. No acute intracranial hemorrhage. Basal cisterns are patent. No extra-axial collection.  Ventricles: No ventriculomegaly.  Bones/joints: Normal. No acute fracture.  Sinuses:  Mucosal thickening involving the right maxillary sinus.  Mastoid air cells: Normal as visualized. No mastoid effusion.  Orbits:  Bilateral cataract surgery.  Soft tissues: Normal.     1. No significant stenosis or aneurysm. 2. No evidence for acute transcortical infarct, acute intracranial hemorrhage, or mass effect. THIS DOCUMENT HAS BEEN ELECTRONICALLY SIGNED BY ROSALIA LENZ MD    Ct Head Without Contrast    Result Date: 1/21/2019  EXAMINATION: CT HEAD WO CONTRAST-  INDICATION: Stroke  TECHNIQUE: Axial CT of the head without intravenous contrast administration  The radiation dose reduction device was turned on for each scan per the ALARA (As Low as Reasonably  Achievable) protocol.  COMPARISON: NONE  FINDINGS: Midline structures are symmetric without evidence of mass, mass effect or midline shift. Ventricles and sulci are prominent in the mesial temporal regions and near the vertex consistent with age-related volume loss and generalized atrophy. Moderate amount of attenuation changes in the periventricular and deep white matter consistent of chronic small vessel ischemic disease. No intra-axial hemorrhage or extra-axial fluid collection. Globes and orbits unremarkable. Visualized paranasal sinuses demonstrate mucus retention cyst right maxillary sinus otherwise grossly clear well-pneumatized. Calvarium intact.      No acute intracranial abnormality specifically no evidence for acute intra-axial hemorrhage or extra-axial fluid collection. No significant large low-attenuation area to suggest large infarct. No mass effect or midline shift with background chronic small vessel ischemic disease evidenced by low-attenuation in the periventricular and deep white matter.       Ct Angiogram Neck With & Without Contrast    Result Date: 1/21/2019  EXAM:  CT Angiography Neck Without And With Contrast EXAM DATE/TIME:  1/21/2019 6:00 PM CLINICAL HISTORY:  83 years old, female; Signs and symptoms; Other: See notes; Additional info: Stroke TECHNIQUE:  Axial computed tomographic angiography images of the neck without and with intravenous contrast using CT angiography protocol.  All CT scans at this facility use at least one of these dose optimization techniques: automated exposure control; mA and/or kV adjustment per patient size (includes targeted exams where dose is matched to clinical indication); or iterative reconstruction.  Coronal and sagittal reformatted images were created and reviewed.  MIP reconstructed images were created and reviewed. CONTRAST:  75 ml of isovue 370 administered intravenously. COMPARISON:  No relevant prior studies available. FINDINGS: VASCULATURE:  Right  common carotid artery: No significant stenosis. No dissection or occlusion.  Right internal carotid artery:  Atherosclerotic disease involving the origin of the right internal carotid artery resulting in approximately 75-85% stenosis. No dissection.  Right external carotid artery: No occlusion or significant stenosis.  Right vertebral artery: No significant stenosis. No dissection or occlusion.  Left common carotid artery: No significant stenosis. No dissection or occlusion.  Left internal carotid artery: Extracranial segment is patent with no significant stenosis. No dissection or occlusion.  Left external carotid artery: No occlusion or significant stenosis.  Left vertebral artery: No significant stenosis. No dissection or occlusion. NECK:  Thyroid:  Left thyroid 1.1 x 0.6 cm nodule.  Bones/joints: No acute fracture.     1. Atherosclerotic disease involving the origin of the right internal carotid artery resulting in approximately 75-85% stenosis.  2. Left thyroid 1.1 x 0.6 cm nodule. No follow-up is necessary. COMMENT: Reference per NASCET criteria for degree of stenosis: Mild: <50% stenosis. Moderate: 50-69% stenosis. Severe: 70-94% stenosis. Near occlusion: 95-99% stenosis. THIS DOCUMENT HAS BEEN ELECTRONICALLY SIGNED BY ROSALIA LENZ MD    Xr Chest 1 View    Result Date: 1/21/2019  EXAM:  XR Chest, 1 View EXAM DATE/TIME:  1/21/2019 7:56 PM CLINICAL HISTORY:  83 years old, female; Signs and symptoms; Other: R/O aspiration, r pleural effusion TECHNIQUE:  XR of the chest, 1 view. COMPARISON:  No relevant prior studies available. FINDINGS:  Lungs:  Degree of lung inflation is normal. No evidence of pulmonary edema. No focal consolidation or parenchymal lung mass.  Pleural space:  Blunted costophrenic angles suggest pleural effusions, right more so than left. No pneumothorax.  Heart/Mediastinum:  Cardiac silhouette appears normal. No adenopathy or hilar mass.  Bones/joints:  Osseous structures show no concerning  abnormality. Bones appear diffusely demineralized.     Pleural effusions, right larger than left suspected, without underlying active pulmonary edema THIS DOCUMENT HAS BEEN ELECTRONICALLY SIGNED BY SHRADDHA GARCIA MD        During this visit the following were done:  Labs Reviewed [x]    Labs Ordered []    Radiology Reports Reviewed [x]    Radiology Ordered [x]    EKG, echo, and/or stress test reviewed [x]    EEG results reviewed  []    EEG reviewed and interpreted per myself   []    Discussed case with neurointerventionalist or neuroradiologist []    Referring Provider Records Reviewed []    ER Records Reviewed []    Hospital Records Reviewed []    History Obtained From Family []    Radiological images view and Interpreted per myself [x]    Case Discussed with referring provider []     Decision to obtain and request outside records  []        Assessment and Plan     RMCA stroke, suspect embolism related to ILIR stenosis vs Afib, s/pTPA.      - ICU observation per TPA protocol.    - NPO.    - Maintain -180, DBP<110.    - Keep HOB flat.    - MRI brain.    - TTE.    - ST/PT/OT.    - Patient is DNR.    - Discussed guarded prognosis with family.    Thanks,    Electronically signed by Perico Alvares MD on 1/21/2019 at 10:00 PM

## 2019-01-22 NOTE — PLAN OF CARE
Problem: Patient Care Overview  Goal: Plan of Care Review  Outcome: Ongoing (interventions implemented as appropriate)   01/22/19 0829   Coping/Psychosocial   Plan of Care Reviewed With patient   OTHER   Outcome Summary PT evaluation completed. Pt demonstrates LLE weakness and decreased indep/safety re: functional mobility (with evolving signs/symptoms), warranting further skilled PT services to promote PLOF. Limited today by difficulty advancing LLE during transfer to the recliner; noted decreased safety awareness. Recommend SNF at d/c.        Problem: Stroke (Ischemic) (Adult)  Goal: Signs and Symptoms of Listed Potential Problems Will be Absent, Minimized or Managed (Stroke)  Outcome: Ongoing (interventions implemented as appropriate)   01/22/19 0829   Goal/Outcome Evaluation   Problems Assessed (Stroke (Ischemic)) cognitive impairment;communication impairment;motor/sensory impairment   Problems Assessed (Stroke (Ischemic)) communication impairment;motor/sensory impairment;cognitive impairment

## 2019-01-22 NOTE — PLAN OF CARE
Problem: Patient Care Overview  Goal: Plan of Care Review  Outcome: Ongoing (interventions implemented as appropriate)   01/22/19 8697   Coping/Psychosocial   Plan of Care Reviewed With patient;family   OTHER   Outcome Summary Awaiting results of 24 hour post-TPA scan, in order to give ASA supp. VSS. R leg swollen, venous duplex showed a large DVT, pulses still palpable. Dr. Hendrickson to be consulted tomorrow. LOGAN PICC TL placed. ECHO in progress. FEEs planned for tomorrow. Will continue to monitor.     Goal: Individualization and Mutuality  Outcome: Ongoing (interventions implemented as appropriate)    Goal: Discharge Needs Assessment  Outcome: Ongoing (interventions implemented as appropriate)    Goal: Interprofessional Rounds/Family Conf  Outcome: Ongoing (interventions implemented as appropriate)      Problem: Skin Injury Risk (Adult)  Goal: Skin Health and Integrity  Outcome: Ongoing (interventions implemented as appropriate)      Problem: Fall Risk (Adult)  Goal: Absence of Fall  Outcome: Ongoing (interventions implemented as appropriate)

## 2019-01-22 NOTE — PROGRESS NOTES
Adult Nutrition  Assessment/PES    Patient Name:  Jennifer Gil  YOB: 1935  MRN: [id number]  Admit Date:  1/21/2019    Assessment Date:  1/22/2019    Comments:  MDR note; pt w/ oral dysfunction and suspected pharyngeal dysphagia per SLP ; FEES pending. RD will monitor.    Reason for Assessment     Row Name 01/22/19 1358          Reason for Assessment    Reason For Assessment  identified at risk by screening criteria;per organizational policy MDR, dysphagia 30 mins     Diagnosis  neurologic conditions;cardiac disease Pt adm w. R parietal CVA s/p tPA; carotid stenosis, afib/flutter; DHF HX: HTN,HLP, CKD, CVA w/ residual L weakness, UTI, anxiety         Nutrition/Diet History     Row Name 01/22/19 1358          Nutrition/Diet History    Factors Affecting Nutritional Intake  difficulty/impaired swallowing;chewing difficulties/inability to chew food RN reports pt failed bedside swallow eval by SLP; FEES planned in am, L weakness , L facial droop0, dysarthria present NIHSS - 16; not requiring ayse to maintain BP parameters           Labs/Tests/Procedures/Meds     Row Name 01/22/19 1402          Labs/Procedures/Meds    Lab Results Reviewed  reviewed, pertinent        Diagnostic Tests/Procedures    Diagnostic Test/Procedure Reviewed  reviewed, pertinent     Diagnostic Test/Procedures Comments  FEES -pending        Medications    Pertinent Medications Reviewed  reviewed, pertinent           Estimated/Assessed Needs     Row Name 01/22/19 1403          Calculation Measurements    Weight Used For Calculations  80.9 kg (178 lb 5.6 oz)        Estimated/Assessed Needs    Additional Documentation  Protein Requirements (Group);Calorie Requirements (Group);Fluid Requirements (Group)        Calorie Requirements    Weight Used For Calorie Calculations  80.9 kg (178 lb 5.6 oz)     Estimated Calorie Requirement (kcal/day)  1600     Estimated Calorie Need Method  [place];kcal/kg;[facility]     Estimated  Calorie Requirement Comment  sedentary acitivity        [employer]    Activity Factors  1.2     Estimated Kcal (Dobson [place])   1618        KCAL/KG    20 Kcal/Kg (kcal)  1618     25 Kcal/Kg (kcal)  2022.5     kcal/kg (Specify)  20        [place]-St. Jeor Equation    RMR ([place]-St. Jeor Equation)  1285.75 x 1.25= 1606        Protein Requirements    Weight Used For Protein Calculations  80.9 kg (178 lb 5.6 oz) upper limit 1.2 gm/kg        Fluid Requirements    Estimated Fluid Requirements (mL/day)  1600     Estimated Fluid Requirement Method  RDA Method     RDA Method (mL)  1600     Susan-Sandro Method (over 20 kg)  3118         Nutrition Prescription Ordered     Row Name 01/22/19 1407          Nutrition Prescription PO    Current PO Diet  NPO         Evaluation of Received Nutrient/Fluid Intake     Row Name 01/22/19 1403          Calculation Measurements    Weight Used For Calculations  80.9 kg (178 lb 5.6 oz)         Evaluation of Prescribed Nutrient/Fluid Intake     Row Name 01/22/19 1403          Calculation Measurements    Weight Used For Calculations  80.9 kg (178 lb 5.6 oz)             Problem/Interventions:  Problem 1     Row Name 01/22/19 1407          Nutrition Diagnoses Problem 1    Problem 1  Swallowing Difficulty     Etiology (related to)  Medical Diagnosis     Neurological  CVA     Signs/Symptoms (evidenced by)  SLP/Swallow eval;NPO     Swallow eval status  Done     Type of SLP Evaluation  Bedside FEES pending - remains NPO                 Intervention Goal     Row Name 01/22/19 1408          Intervention Goal    General  Meet nutritional needs for age/condition     TF/PN  Inititiate TF/PN if unable to pass FEES         Nutrition Intervention     Row Name 01/22/19 1408          Nutrition Intervention    RD/Tech Action  Follow Tx progress;Care plan reviewd;Await begin PO           Education/Evaluation     Row Name 01/22/19 1409          Monitor/Evaluation    Monitor  Per  protocol;Symptoms;Pertinent labs;I&O           Electronically signed by:  Gia Lopez MS,RD,LD  01/22/19 2:10 PM

## 2019-01-23 ENCOUNTER — ANCILLARY PROCEDURE (OUTPATIENT)
Dept: SPEECH THERAPY | Facility: HOSPITAL | Age: 84
End: 2019-01-23
Attending: INTERNAL MEDICINE

## 2019-01-23 LAB
BH CV ECHO MEAS - AI DEC SLOPE: 125.3 CM/SEC^2
BH CV ECHO MEAS - AI MAX PG: 49.7 MMHG
BH CV ECHO MEAS - AI MAX VEL: 352.4 CM/SEC
BH CV ECHO MEAS - AI P1/2T: 823.9 MSEC
BH CV ECHO MEAS - AO MAX PG (FULL): 22 MMHG
BH CV ECHO MEAS - AO MAX PG: 26 MMHG
BH CV ECHO MEAS - AO MEAN PG (FULL): 1.7 MMHG
BH CV ECHO MEAS - AO MEAN PG: 3.6 MMHG
BH CV ECHO MEAS - AO ROOT AREA (BSA CORRECTED): 1.1
BH CV ECHO MEAS - AO ROOT AREA: 3.2 CM^2
BH CV ECHO MEAS - AO ROOT DIAM: 2 CM
BH CV ECHO MEAS - AO V2 MAX: 255 CM/SEC
BH CV ECHO MEAS - AO V2 MEAN: 87.7 CM/SEC
BH CV ECHO MEAS - AO V2 VTI: 21.9 CM
BH CV ECHO MEAS - ASC AORTA: 1.7 CM
BH CV ECHO MEAS - AVA(I,A): 1.8 CM^2
BH CV ECHO MEAS - AVA(I,D): 1.8 CM^2
BH CV ECHO MEAS - AVA(V,A): 0.91 CM^2
BH CV ECHO MEAS - AVA(V,D): 0.91 CM^2
BH CV ECHO MEAS - BSA(HAYCOCK): 2 M^2
BH CV ECHO MEAS - BSA: 1.9 M^2
BH CV ECHO MEAS - BZI_BMI: 28.7 KILOGRAMS/M^2
BH CV ECHO MEAS - BZI_METRIC_HEIGHT: 167.6 CM
BH CV ECHO MEAS - BZI_METRIC_WEIGHT: 80.7 KG
BH CV ECHO MEAS - EDV(CUBED): 15.4 ML
BH CV ECHO MEAS - EDV(MOD-SP2): 25 ML
BH CV ECHO MEAS - EDV(MOD-SP4): 18 ML
BH CV ECHO MEAS - EDV(TEICH): 22 ML
BH CV ECHO MEAS - EF(CUBED): 63.9 %
BH CV ECHO MEAS - EF(MOD-SP2): 64 %
BH CV ECHO MEAS - EF(MOD-SP4): 55.6 %
BH CV ECHO MEAS - EF(TEICH): 57.7 %
BH CV ECHO MEAS - ESV(CUBED): 5.5 ML
BH CV ECHO MEAS - ESV(MOD-SP2): 9 ML
BH CV ECHO MEAS - ESV(MOD-SP4): 8 ML
BH CV ECHO MEAS - ESV(TEICH): 9.3 ML
BH CV ECHO MEAS - FS: 28.8 %
BH CV ECHO MEAS - IVS/LVPW: 1.2
BH CV ECHO MEAS - IVSD: 1.1 CM
BH CV ECHO MEAS - LAD MAJOR: 4.2 CM
BH CV ECHO MEAS - LAT PEAK E' VEL: 8.8 CM/SEC
BH CV ECHO MEAS - LATERAL E/E' RATIO: 10.5
BH CV ECHO MEAS - LV DIASTOLIC VOL/BSA (35-75): 9.5 ML/M^2
BH CV ECHO MEAS - LV MASS(C)D: 63.6 GRAMS
BH CV ECHO MEAS - LV MASS(C)DI: 33.4 GRAMS/M^2
BH CV ECHO MEAS - LV MAX PG: 4 MMHG
BH CV ECHO MEAS - LV MEAN PG: 1.8 MMHG
BH CV ECHO MEAS - LV SYSTOLIC VOL/BSA (12-30): 4.2 ML/M^2
BH CV ECHO MEAS - LV V1 MAX: 100 CM/SEC
BH CV ECHO MEAS - LV V1 MEAN: 59.7 CM/SEC
BH CV ECHO MEAS - LV V1 VTI: 16.6 CM
BH CV ECHO MEAS - LVIDD: 2.5 CM
BH CV ECHO MEAS - LVIDS: 1.8 CM
BH CV ECHO MEAS - LVLD AP2: 5.4 CM
BH CV ECHO MEAS - LVLD AP4: 4.8 CM
BH CV ECHO MEAS - LVLS AP2: 4.6 CM
BH CV ECHO MEAS - LVLS AP4: 3.5 CM
BH CV ECHO MEAS - LVOT AREA (M): 2.3 CM^2
BH CV ECHO MEAS - LVOT AREA: 2.3 CM^2
BH CV ECHO MEAS - LVOT DIAM: 1.7 CM
BH CV ECHO MEAS - LVPWD: 0.91 CM
BH CV ECHO MEAS - MED PEAK E' VEL: 5.9 CM/SEC
BH CV ECHO MEAS - MEDIAL E/E' RATIO: 15.4
BH CV ECHO MEAS - MV A MAX VEL: 42.4 CM/SEC
BH CV ECHO MEAS - MV DEC TIME: 0.22 SEC
BH CV ECHO MEAS - MV E MAX VEL: 93.3 CM/SEC
BH CV ECHO MEAS - MV E/A: 2.2
BH CV ECHO MEAS - MV MAX PG: 3.6 MMHG
BH CV ECHO MEAS - MV MEAN PG: 1.1 MMHG
BH CV ECHO MEAS - MV V2 MAX: 94.3 CM/SEC
BH CV ECHO MEAS - MV V2 MEAN: 45 CM/SEC
BH CV ECHO MEAS - MV V2 VTI: 17.3 CM
BH CV ECHO MEAS - MVA(VTI): 2.2 CM^2
BH CV ECHO MEAS - PA ACC SLOPE: 526.9 CM/SEC^2
BH CV ECHO MEAS - PA ACC TIME: 0.1 SEC
BH CV ECHO MEAS - PA MAX PG: 3.7 MMHG
BH CV ECHO MEAS - PA PR(ACCEL): 33.1 MMHG
BH CV ECHO MEAS - PA V2 MAX: 96.6 CM/SEC
BH CV ECHO MEAS - RVSP: 39 MMHG
BH CV ECHO MEAS - SI(AO): 37.3 ML/M^2
BH CV ECHO MEAS - SI(CUBED): 5.2 ML/M^2
BH CV ECHO MEAS - SI(LVOT): 20.3 ML/M^2
BH CV ECHO MEAS - SI(MOD-SP2): 8.4 ML/M^2
BH CV ECHO MEAS - SI(MOD-SP4): 5.3 ML/M^2
BH CV ECHO MEAS - SI(TEICH): 6.7 ML/M^2
BH CV ECHO MEAS - SV(AO): 71 ML
BH CV ECHO MEAS - SV(CUBED): 9.8 ML
BH CV ECHO MEAS - SV(LVOT): 38.6 ML
BH CV ECHO MEAS - SV(MOD-SP2): 16 ML
BH CV ECHO MEAS - SV(MOD-SP4): 10 ML
BH CV ECHO MEAS - SV(TEICH): 12.7 ML
BH CV ECHO MEAS - TAPSE (>1.6): 1.65 CM2
BH CV ECHO MEAS - TR MAX PG: 36 MMHG
BH CV ECHO MEAS - TR MAX VEL: 270.1 CM/SEC
BH CV ECHO MEASUREMENTS AVERAGE E/E' RATIO: 12.69
BH CV XLRA - RV BASE: 2.2 CM
BH CV XLRA - RV LENGTH: 5.2 CM
BH CV XLRA - RV MID: 1.2 CM
BH CV XLRA - TDI S': 19.4 CM/SEC
LEFT ATRIUM VOLUME INDEX: 13.1 ML/M^2
LEFT ATRIUM VOLUME: 25 ML
MAXIMAL PREDICTED HEART RATE: 138 BPM
STRESS TARGET HR: 117 BPM
UFH PPP CHRO-ACNC: 0.8 IU/ML (ref 0.3–0.7)
UFH PPP CHRO-ACNC: 0.97 IU/ML (ref 0.3–0.7)
UFH PPP CHRO-ACNC: >1.1 IU/ML (ref 0.3–0.7)
UFH PPP CHRO-ACNC: >1.1 IU/ML (ref 0.3–0.7)

## 2019-01-23 PROCEDURE — 92612 ENDOSCOPY SWALLOW (FEES) VID: CPT

## 2019-01-23 PROCEDURE — 85520 HEPARIN ASSAY: CPT

## 2019-01-23 PROCEDURE — 25010000002 HEPARIN (PORCINE) IN NACL 25000-0.45 UT/250ML-% SOLUTION

## 2019-01-23 PROCEDURE — 99231 SBSQ HOSP IP/OBS SF/LOW 25: CPT | Performed by: PSYCHIATRY & NEUROLOGY

## 2019-01-23 PROCEDURE — 99291 CRITICAL CARE FIRST HOUR: CPT | Performed by: INTERNAL MEDICINE

## 2019-01-23 PROCEDURE — 97110 THERAPEUTIC EXERCISES: CPT

## 2019-01-23 RX ORDER — HYDROCODONE BITARTRATE AND ACETAMINOPHEN 5; 325 MG/1; MG/1
1 TABLET ORAL EVERY 6 HOURS PRN
Status: DISCONTINUED | OUTPATIENT
Start: 2019-01-23 | End: 2019-01-25 | Stop reason: HOSPADM

## 2019-01-23 RX ORDER — ONDANSETRON 4 MG/1
4 TABLET, FILM COATED ORAL EVERY 8 HOURS PRN
COMMUNITY
End: 2019-01-25 | Stop reason: HOSPADM

## 2019-01-23 RX ORDER — ALBUTEROL SULFATE 2.5 MG/3ML
2.5 SOLUTION RESPIRATORY (INHALATION) EVERY 4 HOURS PRN
COMMUNITY
End: 2019-01-25 | Stop reason: HOSPADM

## 2019-01-23 RX ORDER — POTASSIUM CHLORIDE 750 MG/1
20 TABLET, FILM COATED, EXTENDED RELEASE ORAL DAILY
COMMUNITY
End: 2019-01-25 | Stop reason: HOSPADM

## 2019-01-23 RX ORDER — DILTIAZEM HYDROCHLORIDE 120 MG/1
120 CAPSULE, EXTENDED RELEASE ORAL DAILY
COMMUNITY

## 2019-01-23 RX ORDER — ERGOCALCIFEROL 1.25 MG/1
50000 CAPSULE ORAL WEEKLY
COMMUNITY
End: 2019-01-25 | Stop reason: HOSPADM

## 2019-01-23 RX ORDER — METOPROLOL TARTRATE 5 MG/5ML
5 INJECTION INTRAVENOUS ONCE
Status: COMPLETED | OUTPATIENT
Start: 2019-01-23 | End: 2019-01-23

## 2019-01-23 RX ORDER — ACETAMINOPHEN 500 MG
500 TABLET ORAL EVERY 6 HOURS PRN
COMMUNITY
End: 2019-01-25 | Stop reason: HOSPADM

## 2019-01-23 RX ORDER — DIPHENOXYLATE HYDROCHLORIDE AND ATROPINE SULFATE 2.5; .025 MG/1; MG/1
1 TABLET ORAL DAILY PRN
COMMUNITY

## 2019-01-23 RX ORDER — SIMVASTATIN 10 MG
10 TABLET ORAL NIGHTLY
COMMUNITY
End: 2019-01-25 | Stop reason: HOSPADM

## 2019-01-23 RX ORDER — DOCUSATE SODIUM 100 MG/1
100 CAPSULE, LIQUID FILLED ORAL 2 TIMES DAILY
COMMUNITY
End: 2019-01-25 | Stop reason: HOSPADM

## 2019-01-23 RX ORDER — BISACODYL 5 MG/1
5 TABLET, DELAYED RELEASE ORAL DAILY PRN
COMMUNITY
End: 2019-01-25 | Stop reason: HOSPADM

## 2019-01-23 RX ORDER — MEPROBAMATE 400 MG/1
400 TABLET ORAL 2 TIMES DAILY
COMMUNITY
End: 2019-01-25 | Stop reason: HOSPADM

## 2019-01-23 RX ORDER — CARVEDILOL 12.5 MG/1
12.5 TABLET ORAL 2 TIMES DAILY WITH MEALS
Status: DISCONTINUED | OUTPATIENT
Start: 2019-01-23 | End: 2019-01-25 | Stop reason: HOSPADM

## 2019-01-23 RX ORDER — DILTIAZEM HYDROCHLORIDE 120 MG/1
120 CAPSULE, COATED, EXTENDED RELEASE ORAL
Status: DISCONTINUED | OUTPATIENT
Start: 2019-01-23 | End: 2019-01-25 | Stop reason: HOSPADM

## 2019-01-23 RX ORDER — LAMOTRIGINE 25 MG/1
25 TABLET ORAL 2 TIMES DAILY
COMMUNITY

## 2019-01-23 RX ORDER — METOPROLOL TARTRATE 5 MG/5ML
2.5 INJECTION INTRAVENOUS ONCE
Status: COMPLETED | OUTPATIENT
Start: 2019-01-23 | End: 2019-01-23

## 2019-01-23 RX ADMIN — METOPROLOL TARTRATE 5 MG: 5 INJECTION, SOLUTION INTRAVENOUS at 08:32

## 2019-01-23 RX ADMIN — DILTIAZEM HYDROCHLORIDE 120 MG: 120 CAPSULE, EXTENDED RELEASE ORAL at 15:57

## 2019-01-23 RX ADMIN — NYSTATIN: 100000 POWDER TOPICAL at 09:38

## 2019-01-23 RX ADMIN — CARVEDILOL 12.5 MG: 12.5 TABLET, FILM COATED ORAL at 17:19

## 2019-01-23 RX ADMIN — SODIUM CHLORIDE, PRESERVATIVE FREE 3 ML: 5 INJECTION INTRAVENOUS at 20:18

## 2019-01-23 RX ADMIN — HEPARIN SODIUM 8 UNITS/KG/HR: 10000 INJECTION, SOLUTION INTRAVENOUS at 17:18

## 2019-01-23 RX ADMIN — METOPROLOL TARTRATE 2.5 MG: 5 INJECTION, SOLUTION INTRAVENOUS at 11:00

## 2019-01-23 RX ADMIN — NYSTATIN: 100000 POWDER TOPICAL at 20:17

## 2019-01-23 RX ADMIN — SODIUM CHLORIDE, PRESERVATIVE FREE 10 ML: 5 INJECTION INTRAVENOUS at 20:17

## 2019-01-23 RX ADMIN — ATORVASTATIN CALCIUM 80 MG: 40 TABLET, FILM COATED ORAL at 20:17

## 2019-01-23 RX ADMIN — SODIUM CHLORIDE, PRESERVATIVE FREE 3 ML: 5 INJECTION INTRAVENOUS at 09:44

## 2019-01-23 RX ADMIN — ASPIRIN 300 MG: 300 SUPPOSITORY RECTAL at 09:38

## 2019-01-23 RX ADMIN — HYDROCODONE BITARTRATE AND ACETAMINOPHEN 1 TABLET: 5; 325 TABLET ORAL at 15:56

## 2019-01-23 NOTE — PLAN OF CARE
Problem: Patient Care Overview  Goal: Plan of Care Review  Outcome: Ongoing (interventions implemented as appropriate)   01/23/19 3019   Coping/Psychosocial   Plan of Care Reviewed With patient   Plan of Care Review   Progress improving   OTHER   Outcome Summary Pt able to tolerate dysphagia level 4 diet with nectar thick liquids. Pt. continues to have a fib/ a flutter. Rate controlled with IV metoprolol and resuming home medications. Pt is oriented and speech has improved.

## 2019-01-23 NOTE — THERAPY TREATMENT NOTE
Acute Care - Occupational Therapy Treatment Note  [facility]     Patient Name: Jennifer Gil  : 1935  MRN: [id number]  Today's Date: 2019  Onset of Illness/Injury or Date of Surgery: 19  Date of Referral to OT: 19  Referring Physician: BEAR Osborne    Admit Date: 2019       ICD-10-CM ICD-9-CM   1. Impaired mobility and ADLs Z74.09 799.89   2. Impaired functional mobility, balance, gait, and endurance Z74.09 V49.89   3. Dysphagia, unspecified type R13.10 787.20   4. Cognitive communication deficit R41.841 799.52     Patient Active Problem List   Diagnosis   • Stroke (CMS/HCC)   • Atrial flutter (CMS/HCC)   • CKD (chronic kidney disease)   • Chronic diastolic congestive heart failure (CMS/HCC)   • Essential hypertension   • H/O: CVA (with persistent left sided weakness)   • Hyperlipidemia   • Acute deep vein thrombosis (DVT) of iliac vein (CMS/HCC), Right     Past Medical History:   Diagnosis Date   • Anxiety    • CVA (cerebral vascular accident) (CMS/HCC)    • Diastolic dysfunction    • HLD (hyperlipidemia)    • Hypertension      Past Surgical History:   Procedure Laterality Date   • PARTIAL HYSTERECTOMY         Therapy Treatment    Rehabilitation Treatment Summary     Row Name 19 0804             Treatment Time/Intention    Discipline  occupational therapist  -CL      Document Type  therapy note (daily note)  -CL      Subjective Information  complains of;weakness;fatigue  -CL      Patient Effort  good  -CL      Existing Precautions/Restrictions  fall;oxygen therapy device and L/min;other (see comments) L sided weakness  -CL      Treatment Considerations/Comments  Deferred any EOB or OOB activity this AM, noted high HR and A-fib upon arrival, notified RN.   -CL      Recorded by [CL] Cherie Cabello OT 19 0952      Row Name 19 0804             Vital Signs    Pre Systolic BP Rehab  165  -CL      Pre Treatment Diastolic BP  113  -CL      Pretreatment Heart Rate  (beats/min)  135  -CL      Intratreatment Heart Rate (beats/min)  165  -CL      Posttreatment Heart Rate (beats/min)  116  -CL      Pre SpO2 (%)  94  -CL      O2 Delivery Pre Treatment  supplemental O2  -CL      Pre Patient Position  Supine  -CL      Intra Patient Position  Supine  -CL      Post Patient Position  Supine  -CL      Recorded by [CL] Cherie Cabello OT 01/23/19 0952      Row Name 01/23/19 0804             Cognitive Assessment/Intervention- PT/OT    Affect/Mental Status (Cognitive)  confused  -CL      Orientation Status (Cognition)  oriented to;person;place  -CL      Follows Commands (Cognition)  follows one step commands;75-90% accuracy;verbal cues/prompting required;repetition of directions required;increased processing time needed  -CL      Cognitive Function (Cognitive)  safety deficit  -CL      Safety Deficit (Cognitive)  moderate deficit;awareness of need for assistance;safety precautions awareness  -CL      Personal Safety Interventions  fall prevention program maintained;gait belt;nonskid shoes/slippers when out of bed  -CL      Recorded by [CL] Cherie Cabello OT 01/23/19 0952      Row Name 01/23/19 0804             Bed Mobility Assessment/Treatment    Bed Mobility Assessment/Treatment  rolling left;rolling right;scooting/bridging  -CL      Rolling Left [place] (Bed Mobility)  moderate assist (50% patient effort);verbal cues  -CL      Rolling Right [place] (Bed Mobility)  moderate assist (50% patient effort);verbal cues  -CL      Scooting/Bridging [place] (Bed Mobility)  maximum assist (25% patient effort);2 person assist;verbal cues  -CL      Assistive Device (Bed Mobility)  bed rails;draw sheet  -CL      Comment (Bed Mobility)  --  -CL      Recorded by [CL] Cherie Cabello OT 01/23/19 0952      Row Name 01/23/19 0804             Transfer Assessment/Treatment    Comment (Transfers)  Deferred.   -CL      Recorded by [CL] Cherie Cabello OT 01/23/19 0952      Row Name 01/23/19 0804              ADL Assessment/Intervention    BADL Assessment/Intervention  grooming  -CL      Recorded by [CL] Cherie Cabello, OT 01/23/19 0952      Row Name 01/23/19 0804             Grooming Assessment/Training    [place] Level (Grooming)  hair care, combing/brushing;maximum assist (25% patient effort);verbal cues  -CL      Grooming Position  supported sitting  -CL      Comment (Grooming)  Limited d/t pt w/ poor sustained attention, perseverating on water/food though w/ current NPO status.   -CL      Recorded by [CL] Cherie Cabello OT 01/23/19 0952      Row Name 01/23/19 0804             Motor Skills Assessment/Interventions    Additional Documentation  Therapeutic Exercise (Group)  -CL      Recorded by [CL] hCerie Cabello OT 01/23/19 0952      Row Name 01/23/19 0804             Therapeutic Exercise    Therapeutic Exercise  seated, upper extremities  -CL      61288 - OT Therapeutic Exercise Minutes  7  -CL      99357 - OT Therapeutic Activity Minutes  7  -CL      Recorded by [CL] Cherie Cabello OT 01/23/19 0952      Row Name 01/23/19 0804             Upper Extremity Seated Therapeutic Exercise    Performed, Seated Upper Extremity (Therapeutic Exercise)  shoulder flexion/extension;elbow flexion/extension;wrist flexion/extension;digit flexion/extension;shoulder external/internal rotation  -CL      Exercise Type, Seated Upper Extremity (Therapeutic Exercise)  AROM (active range of motion);AAROM (active assistive range of motion)  -CL      Sets/Reps Detail, Seated Upper Extremity (Therapeutic Exercise)  1/10  -CL      Comment, Seated Upper Extremity (Therapeutic Exercise)  BUE, limited d/t poor sustained attention  -CL      Recorded by [CL] Cherie Cabello OT 01/23/19 0952      Row Name 01/23/19 0804             Positioning and Restraints    Pre-Treatment Position  in bed  -CL      Post Treatment Position  bed  -CL      In Bed  notified nsg;fowlers;call light within reach;encouraged to call for assist;exit alarm on;with  nsg;side rails up x3;side lying left;legs elevated;heels elevated  -CL      Recorded by [CL] Cherie Cabello OT 01/23/19 0952      Row Name 01/23/19 0804             Pain Scale: Numbers Pre/Post-Treatment    Pain Scale: Numbers, Pretreatment  0/10 - no pain  -CL      Pain Scale: Numbers, Post-Treatment  0/10 - no pain  -CL      Recorded by [CL] Cherie Cabello OT 01/23/19 0952        User Key  (r) = Recorded By, (t) = Taken By, (c) = Cosigned By    Initials Name Effective Dates Discipline    CL Cherie Cabello OT 04/03/18 -  OT             Occupational Therapy Education     Title: PT OT SLP Therapies (In Progress)     Topic: Occupational Therapy (In Progress)     Point: ADL training (In Progress)     Description: Instruct learner(s) on proper safety adaptation and remediation techniques during self care or transfers.   Instruct in proper use of assistive devices.    Learning Progress Summary           Patient Acceptance, E, NR by CL at 1/23/2019  9:52 AM    Comment:  Pt educated on appropriate safety precautions, t/f techniques, role of OT, and benefits of therapy.    Acceptance, E, NR by CL at 1/22/2019  8:57 AM    Comment:  Pt educated on appropriate safety precautions, t/f techniques, role of OT, and benefits of therapy.                   Point: Home exercise program (In Progress)     Description: Instruct learner(s) on appropriate technique for monitoring, assisting and/or progressing therapeutic exercises/activities.    Learning Progress Summary           Patient Acceptance, E, NR by CL at 1/23/2019  9:52 AM    Comment:  Pt educated on appropriate safety precautions, t/f techniques, role of OT, and benefits of therapy.                   Point: Precautions (In Progress)     Description: Instruct learner(s) on prescribed precautions during self-care and functional transfers.    Learning Progress Summary           Patient Acceptance, E, NR by CL at 1/23/2019  9:52 AM    Comment:  Pt educated on appropriate safety  precautions, t/f techniques, role of OT, and benefits of therapy.    Acceptance, E, NR by CL at 1/22/2019  8:57 AM    Comment:  Pt educated on appropriate safety precautions, t/f techniques, role of OT, and benefits of therapy.                   Point: Body mechanics (In Progress)     Description: Instruct learner(s) on proper positioning and spine alignment during self-care, functional mobility activities and/or exercises.    Learning Progress Summary           Patient Acceptance, E, NR by CL at 1/22/2019  8:57 AM    Comment:  Pt educated on appropriate safety precautions, t/f techniques, role of OT, and benefits of therapy.                               User Key     Initials Effective Dates Name Provider Type Discipline     04/03/18 -  Cherie Cabello, OT Occupational Therapist OT                OT Recommendation and Plan  Outcome Summary/Treatment Plan (OT)  Anticipated Equipment Needs at Discharge (OT): (TBA further)  Anticipated Discharge Disposition (OT): skilled nursing facility  Therapy Frequency (OT Eval): daily  Plan of Care Review  Plan of Care Reviewed With: patient  Plan of Care Reviewed With: patient  Outcome Summary: Pt session limited d/t elevated HR and poor sustained attention requiring MAX VCs to participate w/ ADL tasks and HEP. Recommend cont skilled IPOT POC. Recommend pt DC to SNF.   Outcome Measures     Row Name 01/23/19 0804 01/22/19 0829 01/22/19 0805       How much help from another person do you currently need...    Turning from your back to your side while in flat bed without using bedrails?  --  2  -LS  --    Moving from lying on back to sitting on the side of a flat bed without bedrails?  --  2  -LS  --    Moving to and from a bed to a chair (including a wheelchair)?  --  2  -LS  --    Standing up from a chair using your arms (e.g., wheelchair, bedside chair)?  --  2  -LS  --    Climbing 3-5 steps with a railing?  --  1  -LS  --    To walk in hospital room?  --  1  -LS  --    AM-PAC 6  Clicks Score  --  10  -LS  --       How much help from another is currently needed...    Putting on and taking off regular lower body clothing?  1  -CL  --  1  -CL    Bathing (including washing, rinsing, and drying)  1  -CL  --  1  -CL    Toileting (which includes using toilet bed pan or urinal)  1  -CL  --  1  -CL    Putting on and taking off regular upper body clothing  2  -CL  --  2  -CL    Taking care of personal grooming (such as brushing teeth)  2  -CL  --  2  -CL    Eating meals  3  -CL  --  2  -CL    Score  10  -CL  --  9  -CL       Modified [place]angel Scale    Modified [place] Scale  4 - Moderately severe disability.  Unable to walk without assistance, and unable to attend to own bodily needs without assistance.  -CL  4 - Moderately severe disability.  Unable to walk without assistance, and unable to attend to own bodily needs without assistance.  -LS  4 - Moderately severe disability.  Unable to walk without assistance, and unable to attend to own bodily needs without assistance.  -CL       Functional Assessment    Outcome Measure Options  AM-PAC 6 Clicks Daily Activity (OT)  -CL  AM-PAC 6 Clicks Basic Mobility (PT)  -LS  AM-PAC 6 Clicks Daily Activity (OT);Modified [place]  -CL      User Key  (r) = Recorded By, (t) = Taken By, (c) = Cosigned By    Initials Name Provider Type    Dinah Saldivar, PT Physical Therapist    CL Cherie Cabello OT Occupational Therapist           Time Calculation:   Time Calculation- OT     Row Name 01/23/19 0804             Time Calculation- OT    OT Start Time  0804  -CL      OT Received On  01/23/19  -CL      OT Goal Re-Cert Due Date  02/01/19  -CL         Timed Charges    61416 - OT Therapeutic Exercise Minutes  7  -CL      92128 - OT Therapeutic Activity Minutes  7  -CL        User Key  (r) = Recorded By, (t) = Taken By, (c) = Cosigned By    Initials Name Provider Type    CL Cherie Cabello OT Occupational Therapist           Therapy Suggested Charges     Code   Minutes Charges     66658 (CPT®) Hc Ot Neuromusc Re Education Ea 15 Min      75245 (CPT®) Hc Ot Ther Proc Ea 15 Min 7 1    73852 (CPT®) Hc Ot Therapeutic Act Ea 15 Min 7     80693 (CPT®) Hc Ot Manual Therapy Ea 15 Min      77072 (CPT®) Hc Ot Iontophoresis Ea 15 Min      86622 (CPT®) Hc Ot Elec Stim Ea-Per 15 Min      47971 (CPT®) Hc Ot Ultrasound Ea 15 Min      21860 (CPT®) Hc Ot Self Care/Mgmt/Train Ea 15 Min      Total  14 1        Therapy Charges for Today     Code Description Service Date Service Provider Modifiers Qty    60237808539 HC OT THERAPEUTIC ACT EA 15 MIN 1/22/2019 Cherie Cabello, OT GO 1    03116250221 HC OT EVAL LOW COMPLEXITY 3 1/22/2019 Cherie Cabello, OT GO 1    57315769820 HC OT THER SUPP EA 15 MIN 1/22/2019 Cherie Cabello OT GO 1    00908759778 HC OT THER PROC EA 15 MIN 1/23/2019 Cherie Cabello OT GO 1    60348999548 HC OT THER SUPP EA 15 MIN 1/23/2019 Cherie Cabello OT GO 1               Cherie Cabello OT  1/23/2019

## 2019-01-23 NOTE — CONSULTS
VASCULAR SURGERY CONSULTATION     Date of Consultation  1/23/2019    Jennifer Gil  [id number]  1935    Requesting Provider:  Judah Jimenez M.D.    Reason for Consultation: Lower extremity DVT    History of present illness:      Patient is a 83 y.o. year old female who is a nursing home resident.  She states that she fell 2 weeks ago.  After falling, she developed swelling and discoloration in the right lower extremity.  The patient has not been ambulating since her fall.  She was admitted at this time for a stroke.  However it was noticed that she has significant swelling in the leg as well as some discoloration on examination.    Medication:    aspirin 325 mg Oral Daily   Or      aspirin 300 mg Rectal Daily   atorvastatin 80 mg Oral Nightly   carvedilol 12.5 mg Oral BID With Meals   diltiaZEM  mg Oral Q24H   diphenhydrAMINE 25 mg Oral Once   lactated ringers 500 mL/hr Intravenous Once   nystatin  Topical Q12H   sodium chloride 10 mL Intravenous Q12H   sodium chloride 3 mL Intravenous Q12H       Allergies:  Allergies   Allergen Reactions   • Keflex [Cephalexin] Anaphylaxis   • Penicillins Anaphylaxis       Problem list    Stroke (CMS/HCC)    Atrial flutter (CMS/HCC)    CKD (chronic kidney disease)    Chronic diastolic congestive heart failure (CMS/HCC)    Essential hypertension    H/O: CVA (with persistent left sided weakness)    Hyperlipidemia    Acute deep vein thrombosis (DVT) of iliac vein (CMS/HCC), Right      History  Past Medical History:   Diagnosis Date   • Anxiety    • CVA (cerebral vascular accident) (CMS/HCC)    • Diastolic dysfunction    • HLD (hyperlipidemia)    • Hypertension        Past Surgical History:   Procedure Laterality Date   • PARTIAL HYSTERECTOMY         family history includes Coronary artery disease in her father and mother; Dementia in her brother; Heart failure in her brother.    Social History     Socioeconomic History   • Marital status:      Spouse name:  "Not on file   • Number of children: Not on file   • Years of education: Not on file   • Highest education level: Not on file   Social Needs   • Financial resource strain: Not on file   • Food insecurity - worry: Not on file   • Food insecurity - inability: Not on file   • Transportation needs - medical: Not on file   • Transportation needs - non-medical: Not on file   Occupational History   • Occupation: nurse   Tobacco Use   • Smoking status: Former Smoker     Types: Cigarettes     Last attempt to quit: 10/1/2015     Years since quitting: 3.3   • Smokeless tobacco: Never Used   Substance and Sexual Activity   • Alcohol use: Not on file   • Drug use: Not on file   • Sexual activity: Not on file   Other Topics Concern   • Not on file   Social History Narrative    Retired nurse       Review of Systems:  All systems were reviewed and negative except for:    Constitution:  positive for See HPI  Musculoskeletal: positive for  joint stiffness and joint swelling  Neurological: positive for  difficulty walking and See HPI    Physical Exam:    Vital Signs:  /83   Pulse (!) 137   Temp 98.3 °F (36.8 °C) (Axillary)   Resp 18   Ht 167.6 cm (65.98\")   Wt 80.7 kg (178 lb)   SpO2 94%   BMI 28.74 kg/m²    Body mass index is 28.74 kg/m².    General: Elderly, frail, cooperative, in no acute distress  Head:  Normocephalic without obvious abnormality  Eyes:  Non-icteric, conjunctivae and sclerae normal, no xanthelasma,    pupils round, corneas clear  ENT:  Teeth, gums and palate within normal limits, oral mucosa slightly moist  Neck:  No masses, no thyromegaly, trachea midline, no JVD,    carotid pulses normal bilaterally without bruits  Respiratory: Lungs exhibit bilateral rales with diminished respiratory effort  CV:  Irregularly irregular rhythm, normal rate, no murmurs or gallops, no rub or click  Abdomen: Obese, No hepatomegaly or splenomegaly, no masses, soft, non-tender    No aneurysm " palpable  Extremities: Significant nonpitting edema in right lower extremity   Pulses: B R F P DP PT   Right  2 2 2 0 0 1   Left  2 2 2 0 0 1    Tenderness to palpation in right calf  Musculoskeletal: Normal ROM in major joints, no obvious deformities  Skin:  No significant lesions, no rash, no ecchymosis    No right lower extremity cyanosis  Neuro: Alert and oriented x 3, CN grossly intact,     no gross motor or sensory deficits    Labs:  Results from last 7 days   Lab Units 01/22/19  1759   WBC 10*3/mm3 18.57*   HEMOGLOBIN g/dL 14.1   HEMATOCRIT % 45.0*   PLATELETS 10*3/mm3 246     Results from last 7 days   Lab Units 01/22/19  0431   SODIUM mmol/L 139   POTASSIUM mmol/L 5.0   CHLORIDE mmol/L 110*   CO2 mmol/L 21.0   BUN mg/dL 43*   CREATININE mg/dL 1.37*   GLUCOSE mg/dL 102*   CALCIUM mg/dL 8.9     Estimated Creatinine Clearance: 33.9 mL/min (A) (by C-G formula based on SCr of 1.37 mg/dL (H)).  Results from last 7 days   Lab Units 01/22/19  1759   APTT seconds 28.7*   INR  1.44*           Radiology:  Imaging Results (last 24 hours)     Procedure Component Value Units Date/Time    Fiberoptic Endo (fees) [[id number]] Resulted:  01/23/19 1115     Updated:  01/23/19 1115    Narrative:       This procedure was auto-finalized with no dictation required.    CT Head Without Contrast [[id number]] Collected:  01/22/19 1640     Updated:  01/22/19 2348    Narrative:       EXAM:    CT Head Without Contrast     EXAM DATE/TIME:    1/22/2019 4:40 PM     CLINICAL HISTORY:    83 years old, female; Dysphagia, unspecified; Cognitive communication deficit;   Other reduced mobility; Other reduced mobility; Signs and symptoms; Other: 24   hour post tpa; Additional info: Stroke     TECHNIQUE:    Axial computed tomography images of the head/brain without contrast.    All CT scans at this facility use at least one of these dose optimization   techniques: automated exposure control; mA and/or kV adjustment per patient   size (includes  targeted exams where dose is matched to clinical indication); or   iterative reconstruction.     COMPARISON:    CT HEAD WO CONTRAST 1/21/2019 5:59 PM     FINDINGS:    Brain:  Age-related involutional changes and chronic microvascular ischemic   disease. No evidence for acute transcortical infarct. No mass effect or midline   shift. No extra-axial collection. No acute intracranial hemorrhage. Basal   cisterns are patent.    Ventricles: Normal. No ventriculomegaly.    Bones/joints: Normal. No acute fracture.    Sinuses: Normal as visualized. No acute sinusitis.    Mastoid air cells: Normal as visualized. No mastoid effusion.    Orbits:  Bilateral cataract surgery.    Soft tissues: Normal.       Impression:       No evidence for acute transcortical infarct, acute intracranial hemorrhage, or   mass effect.     THIS DOCUMENT HAS BEEN ELECTRONICALLY SIGNED BY ROSALIA LENZ MD          Vascular Lab:  I have reviewed the venous duplex examination performed yesterday.  This demonstrates extensive DVT from the right groin to the distal extremity as well as superficial thrombus phlebitis.    ASSESSMENT:   1.  Extensive right lower extremity DVT.  The duration of DVT may very well be 2 weeks or greater.  The patient does not have any evidence of phlegmasia cerulea dolens at this time.  2.  Acute CVA, nearly resolved   3.  Recent fall with subsequent inactivity  4.  Chronic atrial fibrillation    RECOMMENDATIONS/PLAN:  1.  The patient is not a candidate for thrombolysis for multiple reasons.  First the duration of the DVT is probably 2 weeks or greater.  Secondly she has had an acute stroke, which is a contraindication to thrombolysis.  Finally, she is not in danger of limb loss at this time  2.  Extreme Trendelenburg positioning for elevation of the extremity at all times  3.  I concur with intravenous heparin followed by warfarin treatment for optimal management    Keaton Hendrickson MD  01/23/19  6:20 PM

## 2019-01-23 NOTE — THERAPY RE-EVALUATION
Acute Care - Speech Language Pathology   Swallow Re-Evaluation [facility]   Fiberoptic Endoscopic Evaluation of Swallowing (FEES)  2019     Patient Name: Jennifer Gil  : 1935  MRN: [id number]  Today's Date: 2019  Onset of Illness/Injury or Date of Surgery: 19     Referring Physician: BEAR Osborne      Admit Date: 2019    Visit Dx:     ICD-10-CM ICD-9-CM   1. Impaired mobility and ADLs Z74.09 799.89   2. Impaired functional mobility, balance, gait, and endurance Z74.09 V49.89   3. Dysphagia, unspecified type R13.10 787.20   4. Cognitive communication deficit R41.841 799.52     Patient Active Problem List   Diagnosis   • Stroke (CMS/HCC)   • Atrial flutter (CMS/HCC)   • CKD (chronic kidney disease)   • Chronic diastolic congestive heart failure (CMS/HCC)   • Essential hypertension   • H/O: CVA (with persistent left sided weakness)   • Hyperlipidemia   • Acute deep vein thrombosis (DVT) of iliac vein (CMS/HCC), Right     Past Medical History:   Diagnosis Date   • Anxiety    • CVA (cerebral vascular accident) (CMS/HCC)    • Diastolic dysfunction    • HLD (hyperlipidemia)    • Hypertension      Past Surgical History:   Procedure Laterality Date   • PARTIAL HYSTERECTOMY          SWALLOW EVALUATION (last 72 hours)      SLP Adult Swallow Evaluation     Row Name 19 1100 19 1045                Rehab Evaluation    Document Type  re-evaluation  -AW  evaluation  -RD       Subjective Information  no complaints  -AW  complains of;weakness  -RD       Patient Observations  alert;cooperative  -AW  alert;cooperative;agree to therapy  -RD       Patient/Family Observations  no family present  -AW  Dtr present  -RD       Patient Effort  good  -AW  good  -RD          General Information    Patient Profile Reviewed  yes  -AW  yes  -RD       Pertinent History Of Current Problem  --  Adm w/ CVA. Afib, CHF. hx of prior CVA w/ L wkns. S/p TPA @ 9010 19. Failed Rn dysphagia screen per stroke  protocol.   -RD       Current Method of Nutrition  --  NPO  -RD       Precautions/Limitations, Vision  --  vision impairment, left;other (see comments) L hemianopsia per neuro  -RD       Precautions/Limitations, Hearing  --  WFL;for purposes of eval  -RD       Prior Level of Function-Communication  --  WFL  -RD       Prior Level of Function-Swallowing  --  no diet consistency restrictions  -RD       Plans/Goals Discussed with  --  patient;family;agreed upon  -RD       Barriers to Rehab  --  medically complex  -RD       Patient's Goals for Discharge  --  return to PO diet  -RD       Family Goals for Discharge  --  patient able to return to PO diet  -RD          Pain Scale: Numbers Pre/Post-Treatment    Pain Scale: Numbers, Pretreatment  0/10 - no pain  -AW  0/10 - no pain  -RD       Pain Scale: Numbers, Post-Treatment  0/10 - no pain  -AW  0/10 - no pain  -RD          Oral Motor and Function    Dentition Assessment  --  upper dentures/partial in place;dentures are ill fitting  -RD       Secretion Management  --  problems swallowing secretions  -RD       Mucosal Quality  --  moist, healthy  -RD       Volitional Swallow  --  delayed  -RD       Volitional Cough  --  weak;non-productive  -RD          Oral Musculature and Cranial Nerve Assessment    Oral Motor General Assessment  --  mandibular impairment;oral labial or buccal impairment;lingual impairment  -RD       Mandibular Impairment Detail, Cranial Nerve V (Trigeminal)  --  reduced strength on left  -RD       Oral Labial or Buccal Impairment, Detail, Cranial Nerve VII (Facial):  --  left labial droop;reduced ROM  -RD       Lingual Impairment, Detail. Cranial Nerves IX, XII (Glossopharyngeal and Hypoglossal)  --  reduced strength;reduced strength left;reduced lingual ROM  -RD          General Eating/Swallowing Observations    Respiratory Support Currently in Use  --  nasal cannula  -RD       Eating/Swallowing Skills  --  fed by SLP  -RD       Positioning During  Eating  --  upright 90 degree;upright in chair  -RD       Utensils Used  --  spoon;cup;straw  -RD       Consistencies Trialed  --  thin liquids;nectar/syrup-thick liquids;pureed  -RD       Pre SpO2 (%)  --  96  -RD       Post SpO2 (%)  --  83  -RD          Respiratory    Respiratory Status  --  increase in respiratory rate;reduction in SpO2;during swallowing/eating  -RD          Clinical Swallow Eval    Oral Prep Phase  --  impaired  -RD       Oral Transit  --  impaired  -RD       Oral Residue  --  impaired  -RD       Pharyngeal Phase  --  suspected pharyngeal impairment  -RD       Clinical Swallow Evaluation Summary  --  Clinical dysphagia eval complete. Prolonged manipulation and oral residue suctioned via yanker w/ all consistencies. Anterior loss on L side w/ thins, nectar, and pureed. Pt w/ decr'd oral strength and ROM, wkns > on L. Overt s/s of aspiration c/b coughing w/ thin liquids, multiple swallows and inconsistent wet vocal quality w/ nectar-thick and pureed. Need to r/o pharyngeal dysphagia. Pt still not outside of TPA window, so will plan for FEES tomorrow. Safest NPO, meds alt route until FEES in AM 1/23/19  -RD          Oral Prep Concerns    Oral Prep Concerns  --  increased prep time;incomplete or weak lip closure around spoon;anterior loss  -RD       Incomplete or Weak Lip Closure Around Spoon  --  all consistencies  -RD       Anterior Loss  --  thin;nectar;pudding  -RD       Increased Prep Time  --  nectar;pudding  -RD          Oral Transit Concerns    Oral Transit Concerns  --  increased oral transit time  -RD       Increased Oral Transit Time  --  all consistencies  -RD          Oral Residue Concerns    Oral Residue Concerns  --  diffuse residue throughout oral cavity  -RD       Diffuse Residue Throughout Oral Cavity  --  thin;nectar;pudding  -RD       Oral Residue Concerns, Comment  --  Oral residue suctioned via yanker or lost anteriorly on L side  -RD          Pharyngeal Phase Concerns     Pharyngeal Phase Concerns  --  multiple swallows;cough;wet vocal quality  -RD       Wet Vocal Quality  --  nectar;pudding  -RD       Multiple Swallows  --  nectar;pudding  -RD       Cough  --  thin  -RD       Pharyngeal Phase Concerns, Comment  --  Need to r/o pharyngeal dysphagia. Plan for FEES in AM when outside of TPA window  -RD          Fiberoptic Endoscopic Evaluation of Swallowing (FEES)    Risks/Benefits Reviewed  risks/benefits explained;patient  -AW  --       Nasal Entry  left:  -AW  --          Anatomy and Physiology    Anatomic Considerations  no anatomic structural deviation  -AW  --       Velopharyngeal  WFL  -AW  --       Base of Tongue  symmetrical  -AW  --       Epiglottis  WFL  -AW  --       Laryngeal Function Breathing  symmetrical  -AW  --       Laryngeal Function Phonation  symmetrical  -AW  --       Laryngeal Function to Breath Hold  TVF contact  -AW  --       Secretion Rating Scale (Abrahan et al. 1996)  1- secretions present around the laryngeal vestibule  -AW  --       Secretion Description  thick;white;discolored  -AW  --       Ice Chips  DNA  -AW  --       Spontaneous Swallow  frequency adequate  -AW  --       Sensory  sensed scope  -AW  --       Utensils Used  Spoon;Cup;Straw  -AW  --       Consistencies Trialed  thin liquids;nectar-thick liquids;pudding/puree;Regular textures  -AW  --          FEES Interpretation    Oral Phase  reduced lingual control  -AW  --          Initiation of Pharyngeal Swallow    Initiation of Pharyngeal Swallow  bolus in pyriform sinuses  -AW  --       Pharyngeal Phase  impaired pharyngeal phase of swallowing  -AW  --       Penetration Before the Swallow  thin liquids  -AW  --       Penetration During the Swallow  thin liquids  -AW  --       Rosenbek's Scale  3-->Level 3;thin:  -AW  --       Residue  thin liquids;pudding/puree;pyriform sinuses;valleculae;secondary to reduced laryngeal elevation;secondary to reduced base of tongue retraction  -AW  --        Response to Residue  with cued swallow  -AW  --       FEES Summary  FEES complete. Pt coughing up phlegm throughout and spitting into basin. Finally cleared all out. Pt exh mild-mod oropharyngeal dysphagia this exam. She has a delay to the pyriforms with thin and nectar. Penetration of thin did not clear vestibule. She did not penetrate or aspirate nectar or foods. Mild residue, but not posing risk at this time. Rec: L4, NT, straw ok. Will follow for tx.     -AW  --          Clinical Impression    SLP Swallowing Diagnosis  mild;oral dysfunction;pharyngeal dysfunction  -AW  oral dysfunction;suspected pharyngeal dysfunction  -RD       Functional Impact  risk of aspiration/pneumonia  -AW  risk of aspiration/pneumonia;risk of malnutrition;risk of dehydration  -RD       Rehab Potential/Prognosis, Swallowing  good, to achieve stated therapy goals  -AW  adequate, monitor progress closely  -RD       Swallow Criteria for Skilled Therapeutic Interventions Met  demonstrates skilled criteria  -AW  demonstrates skilled criteria  -RD          Recommendations    Therapy Frequency (Swallow)  5 days per week  -AW  evaluation only;PRN  -RD       Predicted Duration Therapy Intervention (Days)  until discharge  -AW  until discharge  -RD       SLP Diet Recommendation  mechanical soft with no mixed consistencies;nectar thick liquids  -AW  NPO;temporary alternate methods of nutrition/hydration  -RD       Recommended Diagnostics  --  FEES;other (see comments) 1/23/19, when outside of TPA window  -RD       Recommended Precautions and Strategies  --  other (see comments) Oral care BID/PRN  -RD       SLP Rec. for Method of Medication Administration  meds whole;with thick liquids  -AW  meds via alternate route  -RD       Anticipated Dischage Disposition  --  inpatient rehabilitation facility;anticipate therapy at next level of care  -RD          Swallow Goals (SLP)    Oral Nutrition/Hydration Goal Selection (SLP)  oral nutrition/hydration, SLP  goal 1  -AW  --          Oral Nutrition/Hydration Goal 1 (SLP)    Oral Nutrition/Hydration Goal 1, SLP  Patient will tolerate L4 diet with nectar thick liquids with no s/s aspiration.  -AW  --       Time Frame (Oral Nutrition/Hydration Goal 1, SLP)  1 day  -AW  --         User Key  (r) = Recorded By, (t) = Taken By, (c) = Cosigned By    Initials Name Effective Dates    Aleida Gaytan, MS CCC-SLP 06/22/15 -     Jacquie Patton MS CCC-SLP 04/03/18 -           EDUCATION  The patient has been educated in the following areas:   Dysphagia (Swallowing Impairment).    SLP Recommendation and Plan  SLP Swallowing Diagnosis: mild, oral dysfunction, pharyngeal dysfunction  SLP Diet Recommendation: mechanical soft with no mixed consistencies, nectar thick liquids              Swallow Criteria for Skilled Therapeutic Interventions Met: demonstrates skilled criteria     Rehab Potential/Prognosis, Swallowing: good, to achieve stated therapy goals  Therapy Frequency (Swallow): 5 days per week  Predicted Duration Therapy Intervention (Days): until discharge       Plan of Care Reviewed With: patient  Plan of Care Review  Plan of Care Reviewed With: patient  Progress: improving    SLP GOALS     Row Name 01/23/19 1100 01/22/19 1030          Oral Nutrition/Hydration Goal 1 (SLP)    Oral Nutrition/Hydration Goal 1, SLP  Patient will tolerate L4 diet with nectar thick liquids with no s/s aspiration.  -AW  --     Time Frame (Oral Nutrition/Hydration Goal 1, SLP)  1 day  -AW  --        Pharyngeal Strengthening Exercise Goal 1 (SLP)    Activity (Pharyngeal Strengthening Goal 1, SLP)  increase superior movement of the hyolaryngeal complex;increase squeeze/positive pressure generation  -AW  --     Increase Superior Movement of the Hyolaryngeal Complex  hard effortful swallow;chin tuck against resistance (CTAR)  -AW  --     Increase Squeeze/Positive Pressure Generation  hard effortful swallow;chin tuck against resistance (CTAR)  -AW   --     [place]/Accuracy (Pharyngeal Strengthening Goal 1, SLP)  with minimal cues (75-90% accuracy)  -AW  --     Time Frame (Pharyngeal Strengthening Goal 1, SLP)  by discharge  -AW  --        Articulation Goal 1 (SLP)    Improve Articulation Goal 1 (SLP)  --  of specific sounds in phrases;of specific sounds in connected speech;by over-articulating at phrase level;by over-articulating in connected speech;80%;with minimal cues (75-90%)  -RD     Time Frame (Articulation Goal 1, SLP)  --  short term goal (STG);by discharge  -RD        Additional Goal 1 (SLP)    Additional Goal 1, SLP  --  LTG: Pt will improve cognitive-communication skills to actively participate in care w/ 100% accuracy w/o cues.   -RD     Time Frame (Additional Goal 1, SLP)  --  by discharge  -RD       User Key  (r) = Recorded By, (t) = Taken By, (c) = Cosigned By    Initials Name Provider Type    Aleida Gaytan MS CCC-SLP Speech and Language Pathologist    Jacquie Patton MS CCC-SLP Speech and Language Pathologist             Time Calculation:   Time Calculation- SLP     Row Name 01/23/19 1139             Time Calculation- SLP    SLP Start Time  1030  -AW      SLP Received On  01/23/19  -        User Key  (r) = Recorded By, (t) = Taken By, (c) = Cosigned By    Initials Name Provider Type    Aleida Gaytan MS CCC-SLP Speech and Language Pathologist          Therapy Charges for Today     Code Description Service Date Service Provider Modifiers Qty    [id number]  ST FIBEROPTIC ENDO EVAL [facility] 6 1/23/2019 Aleida Ayoub MS CCC-SLP GN 1               Aleida Ayoub MS CCC-JOHANNY  1/23/2019

## 2019-01-23 NOTE — PLAN OF CARE
Problem: Patient Care Overview  Goal: Plan of Care Review  Outcome: Ongoing (interventions implemented as appropriate)   01/23/19 9778   Coping/Psychosocial   Plan of Care Reviewed With patient   Plan of Care Review   Progress improving   SLP re-evaluation completed. Will continue to address dysphagia. May have L4, NT diet. Please see note for further details and recommendations.

## 2019-01-23 NOTE — NURSING NOTE
I spoke with BEAR Gama regarding the 24 hr post tPA scan that had not been read by radiology. He looked at the scan, determined there was no bleed and gave the OK for me to give her aspirin. Will continue to monitor.

## 2019-01-23 NOTE — PROGRESS NOTES
"Critical Care Note     LOS: 2 days   Patient Care Team:  Keaton Urrutia MD as PCP - General (Family Medicine)    Chief Complaint/Reason for visit:      CVA   [place] flutter/fib  Hypertension  DVT      Subjective     83-year-old woman, resident of a nursing home rehabilitation center after a hospitalization in [place]. She developed worsening left-sided weakness, left facial droop and dysarthria. She was given TPA and brought to [facility]. CT perfusion did not reveal a large vessel clot.  Neurologic function returned to normal.  January 22 she complained of pain and swelling of her right leg. Duplex revealed extensive clot from the iliac vein to the ankle on the right. Heparin drip initiated    Interval History:   Continues to have pain and swelling of her right lower extremity  Currently in atrial flutter with a rate of 140. Rate did decline to 120 with 5 mg of IV Lopressor  Passed her swallowing examination with a dysphagia 4 diet    Review of Systems:    All systems were reviewed and negative except as noted in subjective.    Medical history, surgical history, social history, family history reviewed    Objective     Intake/Output:    Intake/Output Summary (Last 24 hours) at 1/23/2019 1521  Last data filed at 1/23/2019 1200  Gross per 24 hour   Intake 796.33 ml   Output 250 ml   Net 546.33 ml       Nutrition:  Diet Dysphagia; IV - Mechanical Soft No Mixed Consistencies; Nectar / Syrup Thick; Cardiac    Infusions:    heparin 10 Units/kg/hr Last Rate: 10 Units/kg/hr (01/23/19 1100)   Pharmacy to Dose Heparin     phenylephrine (BEVERLY-SYNEPHRINE) 50 mg/250 (0.2 mg/mL) infusion 0.5-3 mcg/kg/min Last Rate: Stopped (01/22/19 0738)       Telemetry: Atrial flutter             Vital Signs  Blood pressure 134/50, pulse (!) 123, temperature 98.2 °F (36.8 °C), temperature source Oral, resp. rate 18, height 167.6 cm (65.98\"), weight 80.7 kg (178 lb), SpO2 95 %.    Physical Exam:  General Appearance:  Alert elderly " woman sitting upright in the bed    Head:  Atraumatic    Eyes:          Pupils equal and reactive to light    Ears:     Throat: Oral mucosa moist    Neck: Limited range of motion. Trachea midline. No thyromegaly    Back:      Lungs:   Symmetric chest expansion without wheeze or rhonchi     Heart:  Irregular rhythm, increased rate, S1, S2 auscultated    Abdomen:   Bowel sounds present, soft    Rectal:   Deferred   Extremities: Right leg larger than the left, mild pitting edema, leg very tender to motion    Pulses:    Skin: Warm and dry    Lymph nodes:    Neurologic: Speech has cleared. Moves all extremities symmetrically. Mild left facial droop       Results Review:     I reviewed the patient's new clinical results.   Results from last 7 days   Lab Units 01/22/19  0431 01/21/19 2051   SODIUM mmol/L 139 139   POTASSIUM mmol/L 5.0 5.0   CHLORIDE mmol/L 110* 105   CO2 mmol/L 21.0 26.0   BUN mg/dL 43* 50*   CREATININE mg/dL 1.37* 1.49*   CALCIUM mg/dL 8.9 8.3*   GLUCOSE mg/dL 102* 127*     Results from last 7 days   Lab Units 01/22/19  1759 01/22/19  0431 01/21/19 2051   WBC 10*3/mm3 18.57* 19.93* 20.97*   HEMOGLOBIN g/dL 14.1 13.7 13.3   HEMATOCRIT % 45.0* 44.1* 42.4   PLATELETS 10*3/mm3 246 267 265         No results found for: BLOODCX  No results found for: URINECX    I reviewed the patient's new imaging including images and reports.   Interpretation Summary     · Acute right lower extremity deep vein thrombosis noted in the common femoral, profunda femoral, proximal femoral, mid femoral, distal femoral, popliteal, posterior tibial and gastrocnemius/soleal.  · Acute right lower extremity superficial thrombophlebitis noted in the saphenofemoral junction, greater saphenous (above knee) and greater saphenous (below knee).  · All other veins appeared normal bilaterally.        Interpretation Summary     · Left ventricular systolic function is normal.  · Estimated EF appears to be in the range of 61 - 65%  · Mild to  moderate tricuspid valve regurgitation is present.  · Mild aortic valve regurgitation is present.           All medications reviewed.     aspirin 325 mg Oral Daily   Or      aspirin 300 mg Rectal Daily   atorvastatin 80 mg Oral Nightly   carvedilol 12.5 mg Oral BID With Meals   diltiaZEM  mg Oral Q24H   diphenhydrAMINE 25 mg Oral Once   lactated ringers 500 mL/hr Intravenous Once   nystatin  Topical Q12H   sodium chloride 10 mL Intravenous Q12H   sodium chloride 3 mL Intravenous Q12H         Assessment/Plan       Stroke (CMS/HCC)    Acute deep vein thrombosis (DVT) of iliac vein (CMS/HCC), Right    Atrial flutter (CMS/HCC)    CKD (chronic kidney disease)    Chronic diastolic congestive heart failure (CMS/HCC)    Essential hypertension    H/O: CVA (with persistent left sided weakness)    Hyperlipidemia    83-year-old woman with a history of an old stroke with some minimal residual left-sided weakness. She developed worsening neurologic symptoms, received TPA and was brought to our hospital. CT perfusion revealed no large vessel occlusion and no intervention was performed. Strength has returned to baseline. She passed her swallow evaluation with a dysphagia 4 diet. The day she was wincing with movement of her right lower extremity and was noted to have some edema. Duplex revealed extensive clot from the iliac vein down to the ankle. Heparin drip was initiated. PICC line placed for IV access. She is now in atrial flutter with a rapid rate. Echocardiogram revealed normal left ventricular function. Blood pressure has improved so she may tolerate resumption of her oral beta blockers.    PLAN:  Restart Coreg, add oral diltiazem  Vascular surgery to evaluate her extensive DVT  Neuro interventional physician feels she can receive peripheral TPA if needed. She did undergo TPA for her stroke and had no bleed on her 24-hour scan     Dysphagia 4 diet initiated  Continue speech therapy      Caitlyn Jimenez,  MD  01/23/19  3:21 PM      Time:30min  I personally provided care to this critically ill patient as documented above.  Critical care time does not include time spent on separately billed procedures.  Non of my critical care time was concurrent with other critical care providers.

## 2019-01-23 NOTE — PLAN OF CARE
Problem: Patient Care Overview  Goal: Plan of Care Review  Outcome: Ongoing (interventions implemented as appropriate)   01/23/19 0939   Coping/Psychosocial   Plan of Care Reviewed With patient   OTHER   Outcome Summary Pt session limited d/t elevated HR and poor sustained attention requiring MAX VCs to participate w/ ADL tasks and HEP. Recommend cont skilled IPOT POC. Recommend pt DC to SNF.        Problem: Stroke (Ischemic) (Adult)  Goal: Signs and Symptoms of Listed Potential Problems Will be Absent, Minimized or Managed (Stroke)  Outcome: Ongoing (interventions implemented as appropriate)   01/23/19 0468   Goal/Outcome Evaluation   Problems Assessed (Stroke (Ischemic)) cognitive impairment;communication impairment;motor/sensory impairment   Problems Assessed (Stroke (Ischemic)) cognitive impairment;communication impairment;motor/sensory impairment

## 2019-01-23 NOTE — PROGRESS NOTES
Adult Nutrition  Assessment/PES    Patient Name:  Jennifer Gil  YOB: 1935  MRN: [id number]  Admit Date:  1/21/2019    Assessment Date:  1/23/2019    Comments:  Pt tolerating dysphagia diet; sister reports good intake at lunch; limited preferences obtained    Reason for Assessment     Row Name 01/23/19 1757          Reason for Assessment    Reason For Assessment  identified at risk by screening criteria;per organizational policy MDR, dysphagia 30 mins     Diagnosis  neurologic conditions;cardiac disease Pt adm w. R parietal CVA s/p tPA; carotid stenosis, afib/flutter; DHF HX: HTN,HLP, CKD, CVA w/ residual L weakness, UTI, anxiety     Identified At Risk by Screening Criteria  difficulty chewing/swallowing         Nutrition/Diet History     Row Name 01/23/19 1757          Nutrition/Diet History    Typical Food/Fluid Intake  RN reports pt to have a FEES this am; started on heparin for DVT; confused at times     Factors Affecting Nutritional Intake  difficulty/impaired swallowing           Labs/Tests/Procedures/Meds     Row Name 01/23/19 8865          Labs/Procedures/Meds    Lab Results Reviewed  reviewed, pertinent     Lab Results Comments  n/a        Diagnostic Tests/Procedures    Diagnostic Test/Procedure Reviewed  reviewed, pertinent     Diagnostic Test/Procedures Comments  SLP FEES evaluation        Medications    Pertinent Medications Reviewed  reviewed, pertinent     Pertinent Medications Comments  per MDR         Physical Findings     Row Name 01/23/19 1758          Physical Findings    Overall Physical Appearance  obese           Nutrition Prescription Ordered     Row Name 01/23/19 0184          Nutrition Prescription PO    Current PO Diet  Dysphagia     Dysphagia Level  4  Mechanical soft no mixed consistencies     Fluid Consistency  Nectar/syrup thick     Common Modifiers  Cardiac                 Problem/Interventions:  Problem 1     Row Name 01/23/19 7514          Nutrition Diagnoses Problem  1    Swallow eval status  Done     Type of SLP Evaluation  -- FEES     Resolved?  Yes tolerating dysphagia level 4 diet                 Intervention Goal     Row Name 01/23/19 1800          Intervention Goal    General  Meet nutritional needs for age/condition     PO  Tolerate PO;Modify texture/consistency;Increase intake         Nutrition Intervention     Row Name 01/23/19 1800          Nutrition Intervention    RD/Tech Action  Follow Tx progress;Care plan reviewd           Education/Evaluation     Row Name 01/23/19 1800          Education    Education  Provided education regarding;Education topics     Education Topics  Dysphagia modifications;Thickened liquids to pt's sister; pt drowsy and fell asleep     Thickend Liquids  Nectar-thick     Dysphagia Modifications  No mixed consistency        Monitor/Evaluation    Monitor  Per protocol;I&O;Pertinent labs;PO intake;Symptoms           Electronically signed by:  Gia Lopez MS,RD,LD  01/23/19 6:01 PM

## 2019-01-23 NOTE — PROGRESS NOTES
HEPARIN INFUSION  Jennifer Gil is a  83 y.o. female receiving heparin infusion.     Therapy for (VTE/Cardiac):   VTE  Patient Weight: 80.9kg  Initial Bolus (Y/N):   N  Any Bolus (Y/N):   Y    Signs or Symptoms of Bleeding: None noted      VTE (PE/DVT)   Initial Bolus: 80 units/kg (Max 10,000 units)  Initial rate: 18 units/kg/hr (Max 1,500 units/hr)      (Anti-Xa) (IU/mL) Bolus Dose Stop Infusion Rate Change Repeat (Anti-Xa)     ? 0.19 80 units/kg 0 hrs Increase rate by   4 units/kg/hr 6 hrs      0.2 - 0.29 40 units/kg 0 hrs Increase rate by 2 units/kg/hr 6 hrs    0.3 - 0.7  0 0 hrs No change 6 hrs      0.71 - 0.99   0  0 hrs Decrease rate by 2 units/kg/hr 6 hrs      ? 1 0 Hold 1 hr Decrease rate by 3 units/kg/hr 6 hrs            Results from last 7 days   Lab Units 01/22/19  0431 01/21/19 2051   HEMOGLOBIN g/dL 13.7 13.3   HEMATOCRIT % 44.1* 42.4   PLATELETS 10*3/mm3 267 265          Date   Time   Anti-Xa Current Rate (Unit/kg/hr) Bolus   (Units) Rate Change   (Unit/kg/hr) New Rate (Unit/kg/hr) Next   Anti-Xa Comments  Pump Check Daily   1/22 1700 -- -- -- +18 18 2300 DW RN   1/22 2300 >1.1 (critical) 18 --- -3 15 0500 Hold for 1 hr, d/w nurse =lori   1/23 0432 >1.1 15 -- -3 12 0800 Held for 1h. Redraw anti-xa @ 0800   1/23 0730 0.97 12 -- -- 10 1630 Sw GAYLE Perez. Verified pump weight and rate                                                                                                                                                                                              Thanks,  Stephen Jaimes, PharmD  Pharmacy Resident  1/23/2019  7:00 AM

## 2019-01-23 NOTE — PLAN OF CARE
Problem: Patient Care Overview  Goal: Plan of Care Review  Outcome: Ongoing (interventions implemented as appropriate)   01/23/19 0662   Coping/Psychosocial   Plan of Care Reviewed With patient   OTHER   Outcome Summary VSS. Heparin gtt continued with dosing adjustments made from pharmacy. Right leg is swollen and red, but a pulse is palpable. Will continue to monitor.        Problem: Skin Injury Risk (Adult)  Goal: Skin Health and Integrity  Outcome: Ongoing (interventions implemented as appropriate)      Problem: Stroke (Ischemic) (Adult)  Goal: Signs and Symptoms of Listed Potential Problems Will be Absent, Minimized or Managed (Stroke)  Outcome: Outcome(s) achieved Date Met: 01/23/19      Problem: Fall Risk (Adult)  Goal: Absence of Fall  Outcome: Ongoing (interventions implemented as appropriate)

## 2019-01-23 NOTE — PROGRESS NOTES
Neurology       Patient Care Team:  Keaton Urrutia MD as PCP - General (Family Medicine)    Chief complaint: Stroke    History:  Patient's neurologic function is normal.    She is currently being treated for extensive DVT.    She remains on heparin and aspirin.      Past Medical History:   Diagnosis Date   • Anxiety    • CVA (cerebral vascular accident) (CMS/HCC)    • Diastolic dysfunction    • HLD (hyperlipidemia)    • Hypertension        Vital Signs   Vitals:    01/23/19 0900 01/23/19 1000 01/23/19 1100 01/23/19 1200   BP: 154/97 154/96 157/80 131/83   BP Location:    Left arm   Patient Position:    Lying   Pulse: 117 (!) 122 (!) 128 (!) 124   Resp:  18  18   Temp:    98.2 °F (36.8 °C)   TempSrc:    Oral   SpO2: 90% 95% 94% 93%   Weight:       Height:           Physical Exam:   General: Pleasant awake and alert              Neuro: Speech is articulate.    She moves all extremities with normal power.        Results Review:  Reviewed  Results from last 7 days   Lab Units 01/22/19  1759   WBC 10*3/mm3 18.57*   HEMOGLOBIN g/dL 14.1   HEMATOCRIT % 45.0*   PLATELETS 10*3/mm3 246     Results from last 7 days   Lab Units 01/22/19  0431 01/21/19  2051   SODIUM mmol/L 139 139   POTASSIUM mmol/L 5.0 5.0   CHLORIDE mmol/L 110* 105   CO2 mmol/L 21.0 26.0   BUN mg/dL 43* 50*   CREATININE mg/dL 1.37* 1.49*   CALCIUM mg/dL 8.9 8.3*   GLUCOSE mg/dL 102* 127*       Imaging Results (last 24 hours)     Procedure Component Value Units Date/Time    Fiberoptic Endo (fees) [[id number]] Resulted:  01/23/19 1115     Updated:  01/23/19 1115    Narrative:       This procedure was auto-finalized with no dictation required.    CT Head Without Contrast [[id number]] Collected:  01/22/19 1640     Updated:  01/22/19 2348    Narrative:       EXAM:    CT Head Without Contrast     EXAM DATE/TIME:    1/22/2019 4:40 PM     CLINICAL HISTORY:    83 years old, female; Dysphagia, unspecified; Cognitive communication deficit;   Other reduced  mobility; Other reduced mobility; Signs and symptoms; Other: 24   hour post tpa; Additional info: Stroke     TECHNIQUE:    Axial computed tomography images of the head/brain without contrast.    All CT scans at this facility use at least one of these dose optimization   techniques: automated exposure control; mA and/or kV adjustment per patient   size (includes targeted exams where dose is matched to clinical indication); or   iterative reconstruction.     COMPARISON:    CT HEAD WO CONTRAST 1/21/2019 5:59 PM     FINDINGS:    Brain:  Age-related involutional changes and chronic microvascular ischemic   disease. No evidence for acute transcortical infarct. No mass effect or midline   shift. No extra-axial collection. No acute intracranial hemorrhage. Basal   cisterns are patent.    Ventricles: Normal. No ventriculomegaly.    Bones/joints: Normal. No acute fracture.    Sinuses: Normal as visualized. No acute sinusitis.    Mastoid air cells: Normal as visualized. No mastoid effusion.    Orbits:  Bilateral cataract surgery.    Soft tissues: Normal.       Impression:       No evidence for acute transcortical infarct, acute intracranial hemorrhage, or   mass effect.     THIS DOCUMENT HAS BEEN ELECTRONICALLY SIGNED BY ROSALIA LENZ MD    XR Chest 1 View [[id number]] Collected:  01/22/19 1718     Updated:  01/22/19 1721    Narrative:          EXAMINATION: XR CHEST 1 VW-      INDICATION: PICC TIP PLACEMENT; Z74.09-Other reduced mobility;  Z74.09-Other reduced mobility; R13.10-Dysphagia, unspecified;  R41.841-Cognitive communication deficit      COMPARISON: 1/21/2019     FINDINGS: There is a normal cardiac silhouette. A PICC is well  positioned in the superior vena cava. There are patchy right basilar  pulmonary changes with no significant change previous examination of the  previous day.           Impression:       A PICC is well positioned to be a vena cava. There is been  no significant change when compared to previous  examination previous day  otherwise noted.     This report was finalized on 1/22/2019 5:19 PM by Dr. Keaton Montejo MD.       CT Head Without Contrast [[id number]] Collected:  01/21/19 1836     Updated:  01/22/19 1629    Narrative:       EXAMINATION: CT HEAD WO CONTRAST- 01/21/2019     INDICATION: Stroke      TECHNIQUE: Axial CT of the head without intravenous contrast  administration     The radiation dose reduction device was turned on for each scan per the  ALARA (As Low as Reasonably Achievable) protocol.     COMPARISON: NONE     FINDINGS: Midline structures are symmetric without evidence of mass,  mass effect or midline shift. Ventricles and sulci are prominent in the  mesial temporal regions and near the vertex consistent with age-related  volume loss and generalized atrophy. Moderate amount of attenuation  changes in the periventricular and deep white matter consistent of  chronic small vessel ischemic disease. No intra-axial hemorrhage or  extra-axial fluid collection. Globes and orbits unremarkable. Visualized  paranasal sinuses demonstrate mucus retention cyst right maxillary sinus  otherwise grossly clear and well-pneumatized. Calvarium intact.       Impression:       No acute intracranial abnormality specifically no evidence  for acute intra-axial hemorrhage or extra-axial fluid collection. No  significant large low-attenuation area to suggest large infarct. No mass  effect or midline shift with background chronic small vessel ischemic  disease evidenced by low-attenuation in the periventricular and deep  white matter.     D:  01/22/2019  E:  01/22/2019     This report was finalized on 1/22/2019 4:27 PM by Dr. Wally Tran.             Assessment:  Probable embolic stroke post TPA, doing well    Plan:  Eventually the patient will be need to be on long-term anticoagulation with the NOAC or if Dr. Jimenez feels Coumadin is more appropriate that plus a baby aspirin a day.        Comment:  Patient doing  well neurologically.    I'll see her back on request         I discussed the patients findings and my recommendations with patient and primary care team    Mahad Holland MD  01/23/19  1:26 PM

## 2019-01-24 LAB
ANION GAP SERPL CALCULATED.3IONS-SCNC: 7 MMOL/L (ref 3–11)
BUN BLD-MCNC: 43 MG/DL (ref 9–23)
BUN/CREAT SERPL: 43.4 (ref 7–25)
CALCIUM SPEC-SCNC: 8.3 MG/DL (ref 8.7–10.4)
CHLORIDE SERPL-SCNC: 112 MMOL/L (ref 99–109)
CO2 SERPL-SCNC: 24 MMOL/L (ref 20–31)
CREAT BLD-MCNC: 0.99 MG/DL (ref 0.6–1.3)
GFR SERPL CREATININE-BSD FRML MDRD: 54 ML/MIN/1.73
GLUCOSE BLD-MCNC: 95 MG/DL (ref 70–100)
MAGNESIUM SERPL-MCNC: 2.3 MG/DL (ref 1.3–2.7)
POTASSIUM BLD-SCNC: 4.4 MMOL/L (ref 3.5–5.5)
SODIUM BLD-SCNC: 143 MMOL/L (ref 132–146)
UFH PPP CHRO-ACNC: 0.33 IU/ML (ref 0.3–0.7)
UFH PPP CHRO-ACNC: 0.47 IU/ML (ref 0.3–0.7)

## 2019-01-24 PROCEDURE — 80048 BASIC METABOLIC PNL TOTAL CA: CPT | Performed by: INTERNAL MEDICINE

## 2019-01-24 PROCEDURE — 63710000001 DIPHENHYDRAMINE PER 50 MG: Performed by: NURSE PRACTITIONER

## 2019-01-24 PROCEDURE — 99233 SBSQ HOSP IP/OBS HIGH 50: CPT | Performed by: INTERNAL MEDICINE

## 2019-01-24 PROCEDURE — 83735 ASSAY OF MAGNESIUM: CPT | Performed by: INTERNAL MEDICINE

## 2019-01-24 PROCEDURE — 97530 THERAPEUTIC ACTIVITIES: CPT

## 2019-01-24 PROCEDURE — 85520 HEPARIN ASSAY: CPT

## 2019-01-24 RX ADMIN — HYDROCODONE BITARTRATE AND ACETAMINOPHEN 1 TABLET: 5; 325 TABLET ORAL at 06:20

## 2019-01-24 RX ADMIN — NYSTATIN: 100000 POWDER TOPICAL at 20:13

## 2019-01-24 RX ADMIN — CARVEDILOL 12.5 MG: 12.5 TABLET, FILM COATED ORAL at 17:14

## 2019-01-24 RX ADMIN — SODIUM CHLORIDE, PRESERVATIVE FREE 3 ML: 5 INJECTION INTRAVENOUS at 20:12

## 2019-01-24 RX ADMIN — ATORVASTATIN CALCIUM 80 MG: 40 TABLET, FILM COATED ORAL at 20:11

## 2019-01-24 RX ADMIN — CARVEDILOL 12.5 MG: 12.5 TABLET, FILM COATED ORAL at 08:42

## 2019-01-24 RX ADMIN — SODIUM CHLORIDE, PRESERVATIVE FREE 10 ML: 5 INJECTION INTRAVENOUS at 20:11

## 2019-01-24 RX ADMIN — NYSTATIN: 100000 POWDER TOPICAL at 08:30

## 2019-01-24 RX ADMIN — APIXABAN 10 MG: 5 TABLET, FILM COATED ORAL at 20:11

## 2019-01-24 RX ADMIN — DILTIAZEM HYDROCHLORIDE 120 MG: 120 CAPSULE, EXTENDED RELEASE ORAL at 08:42

## 2019-01-24 RX ADMIN — DIPHENHYDRAMINE HYDROCHLORIDE 25 MG: 25 CAPSULE ORAL at 08:42

## 2019-01-24 RX ADMIN — APIXABAN 10 MG: 5 TABLET, FILM COATED ORAL at 11:21

## 2019-01-24 RX ADMIN — HYDROCODONE BITARTRATE AND ACETAMINOPHEN 1 TABLET: 5; 325 TABLET ORAL at 20:12

## 2019-01-24 RX ADMIN — ASPIRIN 325 MG ORAL TABLET 325 MG: 325 PILL ORAL at 08:42

## 2019-01-24 NOTE — PROGRESS NOTES
Continued Stay Note  [facility]     Patient Name: Jennifer Gil  MRN: [id number]  Today's Date: 1/24/2019    Admit Date: 1/21/2019    Discharge Plan     Row Name 01/24/19 0904       Plan    Plan  SNF    Patient/Family in Agreement with Plan  yes    Plan Comments  Goal is to return to [facility] in [place], [place] when medically ready.  She has a bed hold there.  Will need ambulance for transfer.          Discharge Codes    No documentation.       Expected Discharge Date and Time     Expected Discharge Date Expected Discharge Time    Jan 27, 2019             April Rollins RN

## 2019-01-24 NOTE — PROGRESS NOTES
Adult Nutrition  Assessment/PES    Patient Name:  Jennifer Gil  YOB: 1935  MRN: [id number]  Admit Date:  1/21/2019    Assessment Date:  1/24/2019    Comments:  Staff reports pt tolerating dysphagia diet; RD will monitor adequacy of po intake.    Reason for Assessment     Row Name 01/24/19 1504          Reason for Assessment    Reason For Assessment  identified at risk by screening criteria;per organizational policy MDR, 15 mins     Diagnosis  neurologic conditions;cardiac disease Pt adm w. R parietal CVA s/p tPA; carotid stenosis, afib/flutter; DHF HX: HTN,HLP, CKD, CVA w/ residual L weakness, UTI, anxiety     Identified At Risk by Screening Criteria  difficulty chewing/swallowing         Nutrition/Diet History     Row Name 01/24/19 1504          Nutrition/Diet History    Typical Food/Fluid Intake  RN reports pt tolerating diet well eating ; ready for transfer to [facility] scheduled to dc to rehab tomorrow per      Factors Affecting Nutritional Intake  difficulty/impaired swallowing           Labs/Tests/Procedures/Meds     Row Name 01/24/19 1506          Medications    Pertinent Medications Comments  Eliquis started             Nutrition Prescription Ordered     Row Name 01/24/19 1507          Nutrition Prescription PO    Current PO Diet  Dysphagia     Dysphagia Level  4  Mechanical soft no mixed consistencies     Fluid Consistency  Nectar/syrup thick     Common Modifiers  Cardiac                 Problem/Interventions:  Problem 1     Row Name 01/24/19 1508          Nutrition Diagnoses Problem 1    Problem 1  Swallowing Difficulty     Etiology (related to)  Medical Diagnosis     Neurological  CVA     Signs/Symptoms (evidenced by)  SLP/Swallow eval     Swallow eval status  Done     Type of SLP Evaluation  Other (comment) FEES     Resolved?  Yes pt tolerating dysphagia diet per RN report                 Intervention Goal     Row Name 01/24/19 1506          Intervention Goal    General  Meet  nutritional needs for age/condition     PO  Maintain intake;Modify texture/consistency         Nutrition Intervention     Row Name 01/24/19 1509          Nutrition Intervention    RD/Tech Action  Follow Tx progress;Care plan reviewd           Education/Evaluation     Row Name 01/24/19 1509          Monitor/Evaluation    Monitor  Per protocol;I&O;PO intake;Pertinent labs;Symptoms           Electronically signed by:  Gia Lopez MS,RD,LD  01/24/19 3:13 PM

## 2019-01-24 NOTE — PROGRESS NOTES
Continued Stay Note  [facility]     Patient Name: Jennifer Gil  MRN: [id number]  Today's Date: 1/24/2019    Admit Date: 1/21/2019    Discharge Plan     Row Name 01/24/19 1527       Plan    Plan Comments  Plan to return to [facility] in [place] tomorrow at 1200 via [facility] ambulance.  Call report to  # [phone].  They have Epic and can access the discharge ssummary.        Discharge Codes    No documentation.       Expected Discharge Date and Time     Expected Discharge Date Expected Discharge Time    Jan 27, 2019             April Rollins RN

## 2019-01-24 NOTE — PLAN OF CARE
Problem: Patient Care Overview  Goal: Plan of Care Review  Outcome: Ongoing (interventions implemented as appropriate)   01/24/19 0531   OTHER   Outcome Summary Patient has not rested well through the night, otherwise no complaints at this time. VSS, will continue to monitor.      Goal: Individualization and Mutuality  Outcome: Ongoing (interventions implemented as appropriate)    Goal: Discharge Needs Assessment  Outcome: Ongoing (interventions implemented as appropriate)    Goal: Interprofessional Rounds/Family Conf  Outcome: Ongoing (interventions implemented as appropriate)      Problem: Skin Injury Risk (Adult)  Goal: Skin Health and Integrity  Outcome: Ongoing (interventions implemented as appropriate)      Problem: Stroke (Ischemic) (Adult)  Goal: Signs and Symptoms of Listed Potential Problems Will be Absent, Minimized or Managed (Stroke)  Outcome: Ongoing (interventions implemented as appropriate)      Problem: Fall Risk (Adult)  Goal: Absence of Fall  Outcome: Ongoing (interventions implemented as appropriate)

## 2019-01-24 NOTE — PROGRESS NOTES
"Critical Care Note     LOS: 3 days   Patient Care Team:  Keaton Urrutia MD as PCP - General (Family Medicine)    Chief Complaint/Reason for visit:      CVA   [place] flutter/fib  Hypertension  DVT      Subjective     83-year-old woman, resident of a nursing home rehabilitation center after a hospitalization in [place]. She developed worsening left-sided weakness, left facial droop and dysarthria. She was given TPA and brought to [facility]. CT perfusion did not reveal a large vessel clot.  Neurologic function returned to normal.  January 22 she complained of pain and swelling of her right leg. Duplex revealed extensive clot from the iliac vein to the ankle on the right. Heparin drip initiated    Interval History:   Continues to have pain and swelling of her right lower extremity  Currently in atrial flutter with a rate of 140. Rate did decline to 120 with 5 mg of IV Lopressor  Passed her swallowing examination with a dysphagia 4 diet    Review of Systems:    All systems were reviewed and negative except as noted in subjective.    Medical history, surgical history, social history, family history reviewed    Objective     Intake/Output:    Intake/Output Summary (Last 24 hours) at 1/24/2019 1554  Last data filed at 1/24/2019 0600  Gross per 24 hour   Intake 673.3 ml   Output --   Net 673.3 ml       Nutrition:  Diet Dysphagia; IV - Mechanical Soft No Mixed Consistencies; Nectar / Syrup Thick; Cardiac    Infusions:       Telemetry: Atrial flutter             Vital Signs  Blood pressure 128/67, pulse 90, temperature 97.7 °F (36.5 °C), temperature source Axillary, resp. rate 20, height 167.6 cm (65.98\"), weight 80.7 kg (178 lb), SpO2 96 %.    Physical Exam:  General Appearance:  Alert elderly woman sitting upright in the bed    Head:  Atraumatic    Eyes:          Pupils equal and reactive to light    Ears:     Throat: Oral mucosa moist    Neck: Limited range of motion. Trachea midline. No thyromegaly  "   Back:      Lungs:   Symmetric chest expansion without wheeze or rhonchi     Heart:  Irregular rhythm, increased rate, S1, S2 auscultated    Abdomen:   Bowel sounds present, soft    Rectal:   Deferred   Extremities: Right leg larger than the left, mild pitting edema, leg very tender to motion    Pulses:    Skin: Warm and dry    Lymph nodes:    Neurologic: Speech has cleared. Moves all extremities symmetrically. Mild left facial droop       Results Review:     I reviewed the patient's new clinical results.   Results from last 7 days   Lab Units 01/24/19  0553 01/22/19  0431 01/21/19 2051   SODIUM mmol/L 143 139 139   POTASSIUM mmol/L 4.4 5.0 5.0   CHLORIDE mmol/L 112* 110* 105   CO2 mmol/L 24.0 21.0 26.0   BUN mg/dL 43* 43* 50*   CREATININE mg/dL 0.99 1.37* 1.49*   CALCIUM mg/dL 8.3* 8.9 8.3*   GLUCOSE mg/dL 95 102* 127*     Results from last 7 days   Lab Units 01/22/19  1759 01/22/19 0431 01/21/19 2051   WBC 10*3/mm3 18.57* 19.93* 20.97*   HEMOGLOBIN g/dL 14.1 13.7 13.3   HEMATOCRIT % 45.0* 44.1* 42.4   PLATELETS 10*3/mm3 246 267 265         No results found for: BLOODCX  No results found for: URINECX    I reviewed the patient's new imaging including images and reports.   Interpretation Summary     · Acute right lower extremity deep vein thrombosis noted in the common femoral, profunda femoral, proximal femoral, mid femoral, distal femoral, popliteal, posterior tibial and gastrocnemius/soleal.  · Acute right lower extremity superficial thrombophlebitis noted in the saphenofemoral junction, greater saphenous (above knee) and greater saphenous (below knee).  · All other veins appeared normal bilaterally.        Interpretation Summary     · Left ventricular systolic function is normal.  · Estimated EF appears to be in the range of 61 - 65%  · Mild to moderate tricuspid valve regurgitation is present.  · Mild aortic valve regurgitation is present.           All medications reviewed.     apixaban 10 mg Oral Q12H    Followed by      [START ON 1/31/2019] apixaban 5 mg Oral Q12H   aspirin 325 mg Oral Daily   atorvastatin 80 mg Oral Nightly   carvedilol 12.5 mg Oral BID With Meals   diltiaZEM  mg Oral Q24H   nystatin  Topical Q12H   sodium chloride 10 mL Intravenous Q12H   sodium chloride 3 mL Intravenous Q12H         Assessment/Plan       Stroke (CMS/HCC)    Acute deep vein thrombosis (DVT) of iliac vein (CMS/HCC), Right    Atrial flutter (CMS/HCC)    CKD (chronic kidney disease)    Chronic diastolic congestive heart failure (CMS/HCC)    Essential hypertension    H/O: CVA (with persistent left sided weakness)    Hyperlipidemia    83-year-old woman with a history of an old stroke with some minimal residual left-sided weakness. She developed worsening neurologic symptoms, received TPA and was brought to our hospital. CT perfusion revealed no large vessel occlusion and no intervention was performed. Strength has returned to baseline. She passed her swallow evaluation with a dysphagia 4 diet. The day she was wincing with movement of her right lower extremity and was noted to have some edema. Duplex revealed extensive clot from the iliac vein down to the ankle. Heparin drip was initiated.  PICC line placed for IV access. She developed atrial flutter with a rapid rate. Echocardiogram revealed normal left ventricular function. Blood pressure has improved and she is tolerating oral beta blockers. Coreg, diltiazem also started  Vascular surgery does not feel she needs thrombectomy and recommend elevation, anticoagulation. They feel clot has been there for several weeks.     PLAN:   Dysphagia 4 diet   Continue speech therapy  Start Eliquis and discontinue heparin  Aspirin, statin  Coreg increased to 12.5 mg  Continue diltiazem  HERIBERTO hose  Consider working with therapy tomorrow  Telemetry    Caitlyn Jimenez MD  01/24/19  3:54 PM      Time:25min  I personally provided care to this critically ill patient as documented above.   Critical care time does not include time spent on separately billed procedures.  Non of my critical care time was concurrent with other critical care providers.

## 2019-01-24 NOTE — THERAPY TREATMENT NOTE
Acute Care - Physical Therapy Treatment Note  [facility]     Patient Name: Jennifer Gil  : 1935  MRN: [id number]  Today's Date: 2019  Onset of Illness/Injury or Date of Surgery: 19  Date of Referral to PT: 19  Referring Physician: BEAR Osborne    Admit Date: 2019    Visit Dx:    ICD-10-CM ICD-9-CM   1. Impaired mobility and ADLs Z74.09 799.89   2. Impaired functional mobility, balance, gait, and endurance Z74.09 V49.89   3. Dysphagia, unspecified type R13.10 787.20   4. Cognitive communication deficit R41.841 799.52     Patient Active Problem List   Diagnosis   • Stroke (CMS/HCC)   • Atrial flutter (CMS/HCC)   • CKD (chronic kidney disease)   • Chronic diastolic congestive heart failure (CMS/HCC)   • Essential hypertension   • H/O: CVA (with persistent left sided weakness)   • Hyperlipidemia   • Acute deep vein thrombosis (DVT) of iliac vein (CMS/HCC), Right       Therapy Treatment    Rehabilitation Treatment Summary     Row Name 19 0927             Treatment Time/Intention    Discipline  physical therapist  -KR      Document Type  therapy note (daily note)  -KR      Subjective Information  complains of;weakness  -KR      Mode of Treatment  physical therapy  -KR      Care Plan Review  care plan/treatment goals reviewed;risks/benefits reviewed;patient/other agree to care plan  -KR      Therapy Frequency (PT Clinical Impression)  daily  -KR      Patient Effort  good  -KR      Existing Precautions/Restrictions  fall;oxygen therapy device and L/min;other (see comments) L sided weakness  -KR      Recorded by [KR] Asha Bellamy, PT 19 1327      Row Name 19 0927             Vital Signs    Pre Systolic BP Rehab  135  -KR      Pre Treatment Diastolic BP  103  -KR      Post Systolic BP Rehab  132  -KR      Post Treatment Diastolic BP  85  -KR      Pretreatment Heart Rate (beats/min)  93  -KR      Posttreatment Heart Rate (beats/min)  115  -KR      Pre SpO2 (%)  96  -KR       O2 Delivery Pre Treatment  supplemental O2  -KR      Post SpO2 (%)  93  -KR      O2 Delivery Post Treatment  supplemental O2  -KR      Pre Patient Position  Supine  -KR      Intra Patient Position  Standing  -KR      Post Patient Position  Sitting  -KR      Recorded by [KR] Asha Bellamy, PT 01/24/19 1327      Row Name 01/24/19 0927             Cognitive Assessment/Intervention    Additional Documentation  Cognitive Assessment/Intervention (Group)  -KR      Recorded by [KR] Asha Bellamy, PT 01/24/19 1327      Row Name 01/24/19 0927             Cognitive Assessment/Intervention- PT/OT    Affect/Mental Status (Cognitive)  confused  -KR      Orientation Status (Cognition)  oriented to;person;place  -KR      Follows Commands (Cognition)  follows one step commands;75-90% accuracy;delayed response/completion;increased processing time needed;repetition of directions required;verbal cues/prompting required  -KR      Cognitive Function (Cognitive)  safety deficit  -KR      Safety Deficit (Cognitive)  moderate deficit;ability to follow commands;awareness of need for assistance;insight into deficits/self awareness;safety precautions awareness;safety precautions follow-through/compliance  -KR      Personal Safety Interventions  fall prevention program maintained;gait belt;nonskid shoes/slippers when out of bed  -KR      Recorded by [KR] Asha Bellamy, PT 01/24/19 1327      Row Name 01/24/19 0927             Safety Issues, Functional Mobility    Safety Issues Affecting Function (Mobility)  ability to follow commands;awareness of need for assistance;insight into deficits/self awareness;safety precaution awareness;safety precautions follow-through/compliance  -KR      Impairments Affecting Function (Mobility)  balance;cognition;coordination;endurance/activity tolerance;strength;shortness of breath  -KR      Recorded by [KR] Asha Bellamy, PT 01/24/19 1327      Row Name 01/24/19 0927             Bed Mobility  Assessment/Treatment    Bed Mobility Assessment/Treatment  supine-sit  -KR      Supine-Sit [place] (Bed Mobility)  moderate assist (50% patient effort);2 person assist;verbal cues  -KR      Bed Mobility, Safety Issues  decreased use of arms for pushing/pulling;decreased use of legs for bridging/pushing  -KR      Assistive Device (Bed Mobility)  draw sheet;head of bed elevated  -KR      Comment (Bed Mobility)  VC's for sequencing. Pt required assistance at trunk and BLEs.   -KR      Recorded by [KR] Asha Bellamy, PT 01/24/19 1327      Row Name 01/24/19 0927             Transfer Assessment/Treatment    Transfer Assessment/Treatment  bed-chair transfer;sit-stand transfer;stand-sit transfer  -KR      Comment (Transfers)  Increased cueing for sequencing and technique. Pt limited by confusion and difficulty following commands; demonstrated poor safety awareness.   -KR      Recorded by [KR] Asha Bellamy, PT 01/24/19 1327      Row Name 01/24/19 0927             Bed-Chair Transfer    Bed-Chair [place] (Transfers)  maximum assist (25% patient effort);2 person assist;verbal cues  -KR      Assistive Device (Bed-Chair Transfers)  walker, front-wheeled  -KR      Recorded by [KR] Asha Bellamy, PT 01/24/19 1327      Row Name 01/24/19 0927             Sit-Stand Transfer    Sit-Stand [place] (Transfers)  minimum assist (75% patient effort);2 person assist;verbal cues  -KR      Recorded by [KR] Asha Bellamy, PT 01/24/19 1327      Row Name 01/24/19 0927             Stand-Sit Transfer    Stand-Sit [place] (Transfers)  moderate assist (50% patient effort);2 person assist;verbal cues  -KR      Recorded by [KR] Asha Bellamy, PT 01/24/19 1327      Row Name 01/24/19 0927             Gait/Stairs Assessment/Training    Gait/Stairs Assessment/Training  gait/ambulation assistive device  -KR      [place] Level (Gait)  maximum assist (25% patient effort);2 person assist;verbal cues  -KR      Assistive Device  (Gait)  walker, front-wheeled  -KR      Distance in Feet (Gait)  2  -KR      Pattern (Gait)  step-to  -KR      Deviations/Abnormal Patterns (Gait)  cody decreased;other (see comments) decreased step length  -KR      Comment (Gait/Stairs)  Pt able to take four steps from bed to chair demonstrating very forward flexed posture and slow speed. Pt c/o increased B LE weakness and attempted to sit multiple times before arriving at chair despite cueing for increased safety awareness.   -KR      Recorded by [KR] Asha Bellamy, PT 01/24/19 1327      Row Name 01/24/19 0927             Motor Skills Assessment/Interventions    Additional Documentation  Balance (Group)  -KR      Recorded by [KR] Asha Bellamy, PT 01/24/19 1327      Row Name 01/24/19 0927             Therapeutic Exercise    48029 - PT Therapeutic Activity Minutes  38  -KR      Recorded by [KR] Asha Bellamy, PT 01/24/19 1327      Row Name 01/24/19 0927             Balance    Balance  static sitting balance;static standing balance;dynamic standing balance  -KR      Recorded by [KR] Asha Bellamy, PT 01/24/19 1327      Row Name 01/24/19 0927             Static Sitting Balance    Level of [place] (Unsupported Sitting, Static Balance)  minimal assist, 75% patient effort  -KR      Sitting Position (Unsupported Sitting, Static Balance)  sitting on edge of bed  -KR      Comment (Unsupported Sitting, Static Balance)  pt demonstrated significant anterior lean  -KR      Recorded by [KR] Asha Bellamy, PT 01/24/19 1327      Row Name 01/24/19 0927             Static Standing Balance    Level of [place] (Supported Standing, Static Balance)  minimal assist, 75% patient effort regressed to modA with fatigue  -KR      Assistive Device Utilized (Supported Standing, Static Balance)  walker, rolling  -KR      Recorded by [KR] Asha Bellamy, PT 01/24/19 1327      Row Name 01/24/19 0927             Dynamic Standing Balance    Level of [place], Reaches  Outside Midline (Standing, Dynamic Balance)  moderate assist, 50 to 74% patient effort  -KR      Assistive Device Utilized (Supported Standing, Dynamic Balance)  walker, rolling  -KR      Recorded by [TAMRA] Asha Bellamy, PT 01/24/19 1327      Row Name 01/24/19 0927             Positioning and Restraints    Pre-Treatment Position  in bed  -KR      Post Treatment Position  chair  -KR      In Chair  notified nsg;reclined;call light within reach;encouraged to call for assist;waffle cushion;on mechanical lift sling;legs elevated  -KR      Recorded by [KR] Asha Bellamy, PT 01/24/19 1327      Row Name 01/24/19 0927             Pain Assessment    Additional Documentation  Pain Scale: Numbers Pre/Post-Treatment (Group)  -KR      Recorded by [TAMRA] Asha Bellamy, PT 01/24/19 1327      Row Name 01/24/19 0927             Pain Scale: Numbers Pre/Post-Treatment    Pain Scale: Numbers, Pretreatment  0/10 - no pain  -KR      Pain Scale: Numbers, Post-Treatment  0/10 - no pain  -KR      Recorded by [TAMRA] Asha Bellamy, PT 01/24/19 1327      Row Name 01/24/19 0927             Plan of Care Review    Plan of Care Reviewed With  patient  -KR      Recorded by [TAMRA] Asha Bellamy, PT 01/24/19 1327      Row Name 01/24/19 0927             Outcome Summary/Treatment Plan (PT)    Daily Summary of Progress (PT)  progress toward functional goals is gradual  -KR      Recorded by [TAMRA] Asha Bellamy, PT 01/24/19 1327        User Key  (r) = Recorded By, (t) = Taken By, (c) = Cosigned By    Initials Name Effective Dates Discipline    Asha Sarkar, PT 04/03/18 -  PT                   Physical Therapy Education     Title: PT OT SLP Therapies (In Progress)     Topic: Physical Therapy (In Progress)     Point: Mobility training (In Progress)     Learning Progress Summary           Patient Acceptance, E, NR by TAMRA at 1/24/2019  9:27 AM    Acceptance, E, VU,NR by CICI at 1/24/2019  5:30 AM    Acceptance, E,D, NR by ALLY at 1/22/2019  8:29 AM                    Point: Home exercise program (In Progress)     Learning Progress Summary           Patient Acceptance, E, NR by KR at 1/24/2019  9:27 AM    Acceptance, E, VU,NR by  at 1/24/2019  5:30 AM    Acceptance, E,D, NR by  at 1/22/2019  8:29 AM                   Point: Body mechanics (In Progress)     Learning Progress Summary           Patient Acceptance, E, NR by KR at 1/24/2019  9:27 AM    Acceptance, E, VU,NR by  at 1/24/2019  5:30 AM    Acceptance, E,D, NR by  at 1/22/2019  8:29 AM                   Point: Precautions (In Progress)     Learning Progress Summary           Patient Acceptance, E, NR by KR at 1/24/2019  9:27 AM    Acceptance, E, VU,NR by  at 1/24/2019  5:30 AM    Acceptance, E,D, NR by  at 1/22/2019  8:29 AM                               User Key     Initials Effective Dates Name Provider Type Discipline     06/19/15 -  Dinah Adhikari, PT Physical Therapist PT     04/03/18 -  Asha Bellamy, PT Physical Therapist PT     02/14/18 -  Susan Snyder, RN Registered Nurse Nurse                PT Recommendation and Plan  Therapy Frequency (PT Clinical Impression): daily  Outcome Summary/Treatment Plan (PT)  Daily Summary of Progress (PT): progress toward functional goals is gradual  Plan of Care Reviewed With: patient  Outcome Summary: Pt performed STS transfer from EOB with Oj x2. Pt required maxA x2 to take four steps from bed to chair with RW. Pt demonstrated poor safety awareness and difficulty following commands; limited by confusion. Pt c/o increased B LE weakness. Continue to progress as appropriate.   Outcome Measures     Row Name 01/24/19 0927 01/23/19 0804 01/22/19 0829       How much help from another person do you currently need...    Turning from your back to your side while in flat bed without using bedrails?  2  -KR  --  2  -LS    Moving from lying on back to sitting on the side of a flat bed without bedrails?  2  -KR  --  2  -LS    Moving to and from a bed to a  chair (including a wheelchair)?  2  -KR  --  2  -LS    Standing up from a chair using your arms (e.g., wheelchair, bedside chair)?  3  -KR  --  2  -LS    Climbing 3-5 steps with a railing?  1  -KR  --  1  -LS    To walk in hospital room?  2  -KR  --  1  -LS    AM-PAC 6 Clicks Score  12  -KR  --  10  -LS       How much help from another is currently needed...    Putting on and taking off regular lower body clothing?  --  1  -CL  --    Bathing (including washing, rinsing, and drying)  --  1  -CL  --    Toileting (which includes using toilet bed pan or urinal)  --  1  -CL  --    Putting on and taking off regular upper body clothing  --  2  -CL  --    Taking care of personal grooming (such as brushing teeth)  --  2  -CL  --    Eating meals  --  3  -CL  --    Score  --  10  -CL  --       Modified [place] Scale    Modified [place] Scale  --  4 - Moderately severe disability.  Unable to walk without assistance, and unable to attend to own bodily needs without assistance.  -CL  4 - Moderately severe disability.  Unable to walk without assistance, and unable to attend to own bodily needs without assistance.  -LS       Functional Assessment    Outcome Measure Options  AM-PAC 6 Clicks Basic Mobility (PT)  -KR  AM-PAC 6 Clicks Daily Activity (OT)  -CL  AM-PAC 6 Clicks Basic Mobility (PT)  -LS    Row Name 01/22/19 0805             How much help from another is currently needed...    Putting on and taking off regular lower body clothing?  1  -CL      Bathing (including washing, rinsing, and drying)  1  -CL      Toileting (which includes using toilet bed pan or urinal)  1  -CL      Putting on and taking off regular upper body clothing  2  -CL      Taking care of personal grooming (such as brushing teeth)  2  -CL      Eating meals  2  -CL      Score  9  -CL         Modified [place]inch Scale    Modified [place]inch Scale  4 - Moderately severe disability.  Unable to walk without assistance, and unable to attend to own bodily needs without  assistance.  -CL         Functional Assessment    Outcome Measure Options  AM-PAC 6 Clicks Daily Activity (OT);Modified [place]  -CL        User Key  (r) = Recorded By, (t) = Taken By, (c) = Cosigned By    Initials Name Provider Type    Dinah Saldivar, PT Physical Therapist    CL Cherie Cabello, OT Occupational Therapist    KR Asha Bellamy, PT Physical Therapist         Time Calculation:   PT Charges     Row Name 01/24/19 0927             Time Calculation    Start Time  0927  -KR      PT Received On  01/24/19  -KR      PT Goal Re-Cert Due Date  02/01/19  -KR         Time Calculation- PT    Total Timed Code Minutes- PT  38 minute(s)  -KR         Timed Charges    87410 - PT Therapeutic Activity Minutes  38  -KR        User Key  (r) = Recorded By, (t) = Taken By, (c) = Cosigned By    Initials Name Provider Type    Asha Sarkar, PT Physical Therapist        Therapy Suggested Charges     Code   Minutes Charges    85914 (CPT®) Hc Pt Neuromusc Re Education Ea 15 Min      61348 (CPT®) Hc Pt Ther Proc Ea 15 Min      78758 (CPT®) Hc Gait Training Ea 15 Min      90511 (CPT®) Hc Pt Therapeutic Act Ea 15 Min 38 3    46945 (CPT®) Hc Pt Manual Therapy Ea 15 Min      75542 (CPT®) Hc Pt Iontophoresis Ea 15 Min      51527 (CPT®) Hc Pt Elec Stim Ea-Per 15 Min      31137 (CPT®) Hc Pt Ultrasound Ea 15 Min      10412 (CPT®) Hc Pt Self Care/Mgmt/Train Ea 15 Min      11469 (CPT®) Hc Pt Prosthetic (S) Train Initial Encounter, Each 15 Min      10182 (CPT®) Hc Pt Orthotic(S)/Prosthetic(S) Encounter, Each 15 Min      09133 (CPT®) Hc Orthotic(S) Mgmt/Train Initial Encounter, Each 15min      Total  38 3        Therapy Charges for Today     Code Description Service Date Service Provider Modifiers Qty    57873961916 HC PT THERAPEUTIC ACT EA 15 MIN 1/24/2019 Asha Bellamy, PT GP 3    40967399447 HC PT THER SUPP EA 15 MIN 1/24/2019 Asha Bellamy, PT GP 3          PT G-Codes  Outcome Measure Options: AM-PAC 6 Clicks Basic Mobility  (PT)  AM-PAC 6 Clicks Score: 12  Score: 10  Modified [place] Scale: 4 - Moderately severe disability.  Unable to walk without assistance, and unable to attend to own bodily needs without assistance.    Danielle Bellamy, PT  1/24/2019

## 2019-01-24 NOTE — PLAN OF CARE
Problem: Patient Care Overview  Goal: Plan of Care Review  Outcome: Ongoing (interventions implemented as appropriate)   01/24/19 0119   Coping/Psychosocial   Plan of Care Reviewed With patient   Plan of Care Review   Progress improving   OTHER   Outcome Summary VSS. Pt remains slightly confused. Up to chair with PT with walker. Tolerating food well. Heparin gtt off, Eliquis started. NIH:4. Pt has floor orders.

## 2019-01-24 NOTE — PLAN OF CARE
Problem: Patient Care Overview  Goal: Plan of Care Review  Outcome: Ongoing (interventions implemented as appropriate)   01/24/19 6045   Coping/Psychosocial   Plan of Care Reviewed With patient   OTHER   Outcome Summary Pt performed STS transfer from EOB with Oj x2. Pt required maxA x2 to take four steps from bed to chair with RW. Pt demonstrated poor safety awareness and difficulty following commands; limited by confusion. Pt c/o increased B LE weakness. Continue to progress as appropriate.

## 2019-01-25 VITALS
RESPIRATION RATE: 20 BRPM | OXYGEN SATURATION: 98 % | DIASTOLIC BLOOD PRESSURE: 72 MMHG | TEMPERATURE: 97.8 F | HEART RATE: 89 BPM | SYSTOLIC BLOOD PRESSURE: 128 MMHG | BODY MASS INDEX: 28.61 KG/M2 | WEIGHT: 178 LBS | HEIGHT: 66 IN

## 2019-01-25 LAB
ANION GAP SERPL CALCULATED.3IONS-SCNC: 5 MMOL/L (ref 3–11)
BUN BLD-MCNC: 29 MG/DL (ref 9–23)
BUN/CREAT SERPL: 30.9 (ref 7–25)
CALCIUM SPEC-SCNC: 8.1 MG/DL (ref 8.7–10.4)
CHLORIDE SERPL-SCNC: 110 MMOL/L (ref 99–109)
CO2 SERPL-SCNC: 24 MMOL/L (ref 20–31)
CREAT BLD-MCNC: 0.94 MG/DL (ref 0.6–1.3)
DEPRECATED RDW RBC AUTO: 60.9 FL (ref 37–54)
ERYTHROCYTE [DISTWIDTH] IN BLOOD BY AUTOMATED COUNT: 17.8 % (ref 11.3–14.5)
GFR SERPL CREATININE-BSD FRML MDRD: 57 ML/MIN/1.73
GLUCOSE BLD-MCNC: 92 MG/DL (ref 70–100)
HCT VFR BLD AUTO: 33 % (ref 34.5–44)
HGB BLD-MCNC: 10.1 G/DL (ref 11.5–15.5)
MAGNESIUM SERPL-MCNC: 1.9 MG/DL (ref 1.3–2.7)
MCH RBC QN AUTO: 29 PG (ref 27–31)
MCHC RBC AUTO-ENTMCNC: 30.6 G/DL (ref 32–36)
MCV RBC AUTO: 94.8 FL (ref 80–99)
PHOSPHATE SERPL-MCNC: 2.9 MG/DL (ref 2.4–5.1)
PLATELET # BLD AUTO: 231 10*3/MM3 (ref 150–450)
PMV BLD AUTO: 10.3 FL (ref 6–12)
POTASSIUM BLD-SCNC: 4.4 MMOL/L (ref 3.5–5.5)
RBC # BLD AUTO: 3.48 10*6/MM3 (ref 3.89–5.14)
SODIUM BLD-SCNC: 139 MMOL/L (ref 132–146)
WBC NRBC COR # BLD: 13.59 10*3/MM3 (ref 3.5–10.8)

## 2019-01-25 PROCEDURE — 83735 ASSAY OF MAGNESIUM: CPT | Performed by: INTERNAL MEDICINE

## 2019-01-25 PROCEDURE — 84100 ASSAY OF PHOSPHORUS: CPT | Performed by: INTERNAL MEDICINE

## 2019-01-25 PROCEDURE — 85027 COMPLETE CBC AUTOMATED: CPT | Performed by: INTERNAL MEDICINE

## 2019-01-25 PROCEDURE — 99232 SBSQ HOSP IP/OBS MODERATE 35: CPT | Performed by: INTERNAL MEDICINE

## 2019-01-25 PROCEDURE — 99239 HOSP IP/OBS DSCHRG MGMT >30: CPT | Performed by: NURSE PRACTITIONER

## 2019-01-25 PROCEDURE — 80048 BASIC METABOLIC PNL TOTAL CA: CPT | Performed by: INTERNAL MEDICINE

## 2019-01-25 RX ORDER — NYSTATIN 100000 [USP'U]/G
POWDER TOPICAL EVERY 12 HOURS SCHEDULED
Start: 2019-01-25

## 2019-01-25 RX ORDER — HYDROCODONE BITARTRATE AND ACETAMINOPHEN 5; 325 MG/1; MG/1
1 TABLET ORAL EVERY 6 HOURS PRN
Start: 2019-01-25 | End: 2019-02-02

## 2019-01-25 RX ORDER — MAGNESIUM SULFATE HEPTAHYDRATE 40 MG/ML
2 INJECTION, SOLUTION INTRAVENOUS AS NEEDED
Status: DISCONTINUED | OUTPATIENT
Start: 2019-01-25 | End: 2019-01-25

## 2019-01-25 RX ORDER — ONDANSETRON 2 MG/ML
4 INJECTION INTRAMUSCULAR; INTRAVENOUS EVERY 6 HOURS PRN
Start: 2019-01-25

## 2019-01-25 RX ORDER — ATORVASTATIN CALCIUM 80 MG/1
80 TABLET, FILM COATED ORAL NIGHTLY
Start: 2019-01-25

## 2019-01-25 RX ORDER — ACETAMINOPHEN 650 MG/1
650 SUPPOSITORY RECTAL EVERY 4 HOURS PRN
Start: 2019-01-25

## 2019-01-25 RX ORDER — MAGNESIUM SULFATE HEPTAHYDRATE 40 MG/ML
4 INJECTION, SOLUTION INTRAVENOUS AS NEEDED
Status: DISCONTINUED | OUTPATIENT
Start: 2019-01-25 | End: 2019-01-25

## 2019-01-25 RX ADMIN — ASPIRIN 325 MG ORAL TABLET 325 MG: 325 PILL ORAL at 08:16

## 2019-01-25 RX ADMIN — CARVEDILOL 12.5 MG: 12.5 TABLET, FILM COATED ORAL at 08:17

## 2019-01-25 RX ADMIN — NYSTATIN: 100000 POWDER TOPICAL at 08:16

## 2019-01-25 RX ADMIN — SODIUM CHLORIDE, PRESERVATIVE FREE 10 ML: 5 INJECTION INTRAVENOUS at 08:17

## 2019-01-25 RX ADMIN — MAGNESIUM SULFATE HEPTAHYDRATE 4 G: 40 INJECTION, SOLUTION INTRAVENOUS at 08:20

## 2019-01-25 RX ADMIN — APIXABAN 10 MG: 5 TABLET, FILM COATED ORAL at 08:16

## 2019-01-25 RX ADMIN — DILTIAZEM HYDROCHLORIDE 120 MG: 120 CAPSULE, EXTENDED RELEASE ORAL at 08:16

## 2019-01-25 RX ADMIN — HYDROCODONE BITARTRATE AND ACETAMINOPHEN 1 TABLET: 5; 325 TABLET ORAL at 10:03

## 2019-01-25 NOTE — PROGRESS NOTES
Continued Stay Note  [facility]     Patient Name: Jennifer Gil  MRN: [id number]  Today's Date: 1/25/2019    Admit Date: 1/21/2019    Discharge Plan     Row Name 01/25/19 0919       Plan    Plan Comments  Plan to return to [facility] in [place] today at 1200.      Final Note  SNF        Discharge Codes    No documentation.       Expected Discharge Date and Time     Expected Discharge Date Expected Discharge Time    Jan 27, 2019             April Rollins RN

## 2019-01-25 NOTE — PROGRESS NOTES
Case Management Discharge Note    Final Note: SNF    Destination      No service has been selected for the patient.      Durable Medical Equipment      No service has been selected for the patient.      Dialysis/Infusion      No service has been selected for the patient.      Home Medical Care      No service has been selected for the patient.      Community Resources      No service has been selected for the patient.             Final Discharge Disposition Code: 03 - skilled nursing facility (SNF)

## 2019-01-25 NOTE — PROGRESS NOTES
"Critical Care Note     LOS: 4 days   Patient Care Team:  Keaton Urrutia MD as PCP - General (Family Medicine)    Chief Complaint/Reason for visit:      CVA   [place] flutter/fib  Hypertension  DVT      Subjective     83-year-old woman, resident of a nursing home rehabilitation center after a hospitalization in [place]. She developed worsening left-sided weakness, left facial droop and dysarthria. She was given TPA and brought to [facility]. CT perfusion did not reveal a large vessel clot.  Neurologic function returned to normal.  January 22 she complained of pain and swelling of her right leg. Duplex revealed extensive clot from the iliac vein to the ankle on the right. Heparin drip initiated    Interval History:   Tolerating PO diet  Atrial flutter with controlled rate      Review of Systems:    All systems were reviewed and negative except as noted in subjective.    Medical history, surgical history, social history, family history reviewed    Objective     Intake/Output:    Intake/Output Summary (Last 24 hours) at 1/25/2019 0851  Last data filed at 1/25/2019 0600  Gross per 24 hour   Intake 547.5 ml   Output 50 ml   Net 497.5 ml       Nutrition:  Diet Dysphagia; IV - Mechanical Soft No Mixed Consistencies; Nectar / Syrup Thick; Cardiac    Infusions:       Telemetry: Atrial flutter             Vital Signs  Blood pressure 122/52, pulse 107, temperature 97.3 °F (36.3 °C), temperature source Oral, resp. rate 20, height 167.6 cm (65.98\"), weight 80.7 kg (178 lb), SpO2 98 %.    Physical Exam:  General Appearance:  Alert elderly woman sitting upright in the bed    Head:  Atraumatic    Eyes:          Pupils equal and reactive to light    Ears:     Throat: Oral mucosa moist    Neck: Limited range of motion. Trachea midline. No thyromegaly    Back:      Lungs:   Symmetric chest expansion without wheeze or rhonchi     Heart:  Irregular rhythm, increased rate, S1, S2 auscultated    Abdomen:   Bowel sounds " present, soft    Rectal:   Deferred   Extremities: Right leg larger than the left, mild pitting edema, leg very tender to motion    Pulses:    Skin: Warm and dry    Lymph nodes:    Neurologic: Speech has cleared. Moves all extremities symmetrically. Mild left facial droop       Results Review:     I reviewed the patient's new clinical results.   Results from last 7 days   Lab Units 01/25/19  0415 01/24/19  0553 01/22/19  0431   SODIUM mmol/L 139 143 139   POTASSIUM mmol/L 4.4 4.4 5.0   CHLORIDE mmol/L 110* 112* 110*   CO2 mmol/L 24.0 24.0 21.0   BUN mg/dL 29* 43* 43*   CREATININE mg/dL 0.94 0.99 1.37*   CALCIUM mg/dL 8.1* 8.3* 8.9   GLUCOSE mg/dL 92 95 102*     Results from last 7 days   Lab Units 01/25/19  0415 01/22/19  1759 01/22/19  0431   WBC 10*3/mm3 13.59* 18.57* 19.93*   HEMOGLOBIN g/dL 10.1* 14.1 13.7   HEMATOCRIT % 33.0* 45.0* 44.1*   PLATELETS 10*3/mm3 231 246 267         No results found for: BLOODCX  No results found for: URINECX    I reviewed the patient's new imaging including images and reports.   Interpretation Summary     · Acute right lower extremity deep vein thrombosis noted in the common femoral, profunda femoral, proximal femoral, mid femoral, distal femoral, popliteal, posterior tibial and gastrocnemius/soleal.  · Acute right lower extremity superficial thrombophlebitis noted in the saphenofemoral junction, greater saphenous (above knee) and greater saphenous (below knee).  · All other veins appeared normal bilaterally.        Interpretation Summary     · Left ventricular systolic function is normal.  · Estimated EF appears to be in the range of 61 - 65%  · Mild to moderate tricuspid valve regurgitation is present.  · Mild aortic valve regurgitation is present.    FINDINGS:  Severe motion degradation. Punctate diffusion restriction is  noted in the right superior parietal lobule compatible with a lacunar  infarct of the cortex. High signal in the left cerebellar hemisphere  most likely  represent artifact. Old infarcts are noted in the sruthi and  thalami. There is confluent white matter disease in the periventricular  supratentorial location. No mass effect or shift of the midline  structures. The flow void for the right petrous internal carotid artery  is absent, perhaps due to occlusion, slow flow or stenosis, the latter  of which was seen on the comparison CTA. The [place] flow voids  are preserved. Minimal mucosal thickening in the right maxillary sinus.     IMPRESSION: MRI  1. Motion degraded study.  2. Punctate acute infarct in the superior right parietal lobule.  3. Stenosis, slow flow or occlusion of the petrous segment right  internal carotid artery.     NOTE: Discussed with ICU nurse Merissa at 8:46 AM on 01/22/2019.     D:  01/22/2019  E:  01/22/2019        All medications reviewed.     apixaban 10 mg Oral Q12H   Followed by      [START ON 1/31/2019] apixaban 5 mg Oral Q12H   aspirin 325 mg Oral Daily   atorvastatin 80 mg Oral Nightly   carvedilol 12.5 mg Oral BID With Meals   diltiaZEM  mg Oral Q24H   nystatin  Topical Q12H   sodium chloride 10 mL Intravenous Q12H   sodium chloride 3 mL Intravenous Q12H         Assessment/Plan       Stroke (CMS/HCC)    Acute deep vein thrombosis (DVT) of iliac vein (CMS/HCC), Right    Atrial flutter (CMS/HCC)    CKD (chronic kidney disease)    Chronic diastolic congestive heart failure (CMS/HCC)    Essential hypertension    H/O: CVA (with persistent left sided weakness)    Hyperlipidemia    83-year-old woman with a history of an old stroke with some minimal residual left-sided weakness. She developed worsening neurologic symptoms, received TPA and was brought to our hospital. CT perfusion revealed no large vessel occlusion and no intervention was performed. Strength has returned to baseline. She passed her swallow evaluation with a dysphagia 4 diet. The day she was wincing with movement of her right lower extremity and was noted to have some  edema. Duplex revealed extensive clot from the iliac vein down to the ankle. Heparin drip was initiated.  PICC line placed for IV access. She developed atrial flutter with a rapid rate. Echocardiogram revealed normal left ventricular function. Blood pressure has improved and she is tolerating oral beta blockers. Coreg, diltiazem also started  Vascular surgery does not feel she needs thrombectomy and recommend elevation, anticoagulation. They feel clot has been there for several weeks.     PLAN:   Dysphagia 4 diet   Continue speech therapy   Eliquis   Aspirin, statin  Coreg 12.5 mg  Continue diltiazem  Needs to fit for compression stockings   DC to rehab center  She can work with PT/OT with compression stockings, then elevate LEswhen not working with therapy    Caitlyn Jimenez MD  01/25/19  8:51 AM      Time:25min  I personally provided care to this critically ill patient as documented above.  Critical care time does not include time spent on separately billed procedures.  Non of my critical care time was concurrent with other critical care providers.

## 2019-01-25 NOTE — DISCHARGE SUMMARY
Discharge Summary    Patient name: Jennifer Gil  CSN: [id number]  MRN: [id number]  : 1935  Today's date: 2019     Date of Admission: 2019  Date of Discharge:  2019    Admitting Physician:  Dr. Hernandez Hutchison  Primary Care Provider: Keaton Urrutia MD  Consultations:   Neurology: Dr. Alvares  NI:  Dr. Reynoso  Vascular Surgery:  Dr. Hendrickson    Admission Diagnosis:   CVA     Discharge Diagnoses:   Active Hospital Problems    Diagnosis   • **Stroke (CMS/[facility])   • Acute deep vein thrombosis (DVT) of iliac vein (CMS/[facility]), Right   • Atrial flutter (CMS/HCC)   • Chronic diastolic congestive heart failure (CMS/[facility])   • Essential hypertension   • Hyperlipidemia   • CKD (chronic kidney disease)     Allergies:  Keflex [cephalexin] and Penicillins    Code Status:  Code Status and Medical Interventions:   Ordered at: 19     Limited Support to NOT Include:    Cardioversion/Defibrillation    Intubation     Level Of Support Discussed With:    Next of Kin (If No Surrogate)    Health Care Surrogate     Code Status:    No CPR     Medical Interventions (Level of Support Prior to Arrest):    Limited     Comments:    Discussed with family at bedside     Procedures:  19 TTE - Left ventricular systolic function is normal.  Estimated EF appears to be in the range of 61 - 65%. Mild to moderate tricuspid valve regurgitation is present. Mild aortic valve regurgitation is present.    19 BLE venous duplex - Acute right lower extremity deep vein thrombosis noted in the common femoral, profunda femoral, proximal femoral, mid femoral, distal femoral, popliteal, posterior tibial and gastrocnemius/soleal.  Acute right lower extremity superficial thrombophlebitis noted in the saphenofemoral junction, greater saphenous (above knee) and greater saphenous (below knee).  All other veins appeared normal bilaterally.    History of Present Illness:  Jennifer Gil is a 83 y.o.  female on whom I am  evaluating as a Code 19 for a possible acute stroke.  Mrs. Gil is a resident at [facility] where she is currently receiving inpatient rehabilitation after a recent admission for UTI and pleural effusions.  Her granddaughter was visiting her and states that she was in her normal state of health at approximately 1200.  At 1415 staff noted that she had left sided facial droop, left sided weakness (worse than baseline), and dysarthria.  She was transported to [facility] where she had a CT head which was negative for hemorrhage and/or acute process.  She met criteria for IV alteplase which was discussed with her daughter who agreed to proceed with administration.  Dr. Alvares was contacted by who accepted her for transfer.  He asked [facility] to contact the interventional team to determine if she may be a candidate for neurological intervention.  It is noted that the patient has had a prior CVA with mild left sided residual weakness (per report she ambulates without assistance and takes care of herself.     Additional HPI:  The patient has been admitted to the intensive care unit.  She received TPA at the outside hospital.  CT perfusion reportedly shows no large vessel occlusion. The family indicates that there has been no neurologic improvement since she has received a TPA.  She is currently lethargic and doesn't respond to questions appropriately for me     Apparently has had confusion and choking on eating the last few days    Hospital Course:  She had an MRI brain that showed acute infarct in the superior right parietal lobe and Rt ICA stenosis. She had a TTE and BLE venous duplex with results above.  Vascular surgery was consulted for the DVT and felt she was not a candidate for thrombolysis due to probably being 2 weeks or greater, the acute stroke and was not in danger of limb loss at this time. NeuroInterventional was consulted. Given that she did not have an  "intracranial large vessel occlusion, she was deemed not a candidate for emergent neuro intervention. He recommended carotid intervention in the future. She was placed on a heparin drip and bridged to aspirin and Eliquis. She passed a FEES to dysphagia IV, Mechanical soft w/nectar thick. She had some Atrial Flutter and was restarted on her Coreg and Diltiazem.  She needs to be fitted for compression stockings.  She is eating well and has been working with rehab.  It is felt that she is ready for discharge to [facility].     Vitals:  /55   Pulse 82   Temp 98.1 °F (36.7 °C) (Axillary)   Resp 18   Ht 167.6 cm (65.98\")   Wt 80.7 kg (178 lb)   SpO2 97%   BMI 28.74 kg/m²     Physical Exam:  Constitutional:  Appears well-developed and well-nourished. No distress.   HEENT:  Normocephalic and atraumatic. PERRL  Neck:  Neck supple. No JVD present.   CV: Normal rate, irregularly irregular rhythm,  intact distal pulses.  No gallop, murmur or rub.  Pulmonary/Chest: Effort normal and breath sounds normal. No respiratory distress. No wheezes, rhonchi or rales.   Abdominal: Soft. +BS. No distension and no mass. There is no tenderness.   Musculoskeletal: generalized weakness  Skin: Skin is warm and dry. No rash noted.   Extremities:  No clubbing, edema or cyanosis.  Generalized ecchymosis  Psychiatric: Normal mood and affect. Behavior is normal.     Interval: (shift change)  1a. Level of Consciousness: 0-->Alert, keenly responsive  1b. LOC Questions: 0-->Answers both questions correctly  1c. LOC Commands: 0-->Performs both tasks correctly  2. Best Gaze: 1-->Partial gaze palsy, gaze is abnormal in one or both eyes, but forced deviation or total gaze paresis is not present  3. Visual: 1-->Partial hemianopia  4. Facial Palsy: 1-->Minor paralysis (flattened nasolabial fold, asymmetry on smiling)  5a. Motor Arm, Left: 0-->No drift, limb holds 90 (or 45) degrees for full 10 secs  5b. Motor Arm, Right: 0-->No drift, limb " holds 90 (or 45) degrees for full 10 secs  6a. Motor Leg, Left: 0-->No drift, leg holds 30 degree position for full 5 secs  6b. Motor Leg, Right: 0-->No drift, leg holds 30 degree position for full 5 secs  7. Limb Ataxia: 0-->Absent  8. Sensory: 0-->Normal, no sensory loss  9. Best Language: 0-->No aphasia, normal  10. Dysarthria: 1-->Mild-to-moderate dysarthria, patient slurs at least some words and, at worst, can be understood with some difficulty  11. Extinction and Inattention (formerly Neglect): 0-->No abnormality    Total (NIH Stroke Scale): 4    Labs:  Results from last 7 days   Lab Units 01/25/19  0415   WBC 10*3/mm3 13.59*   HEMOGLOBIN g/dL 10.1*   HEMATOCRIT % 33.0*   PLATELETS 10*3/mm3 231     Results from last 7 days   Lab Units 01/25/19  0415   SODIUM mmol/L 139   POTASSIUM mmol/L 4.4   CHLORIDE mmol/L 110*   CO2 mmol/L 24.0   BUN mg/dL 29*   CREATININE mg/dL 0.94   CALCIUM mg/dL 8.1*   GLUCOSE mg/dL 92         Magnesium   Date Value Ref Range Status   01/25/2019 1.9 1.3 - 2.7 mg/dL Final   01/24/2019 2.3 1.3 - 2.7 mg/dL Final     Phosphorus   Date Value Ref Range Status   01/25/2019 2.9 2.4 - 5.1 mg/dL Final              Discharge Medications      New Medications      Instructions Start Date   acetaminophen 650 MG suppository  Commonly known as:  TYLENOL  Replaces:  acetaminophen 500 MG tablet   650 mg, Rectal, Every 4 Hours PRN      apixaban 5 MG tablet tablet  Commonly known as:  ELIQUIS   10 mg, Oral, Every 12 Hours Scheduled      apixaban 5 MG tablet tablet  Commonly known as:  ELIQUIS   5 mg, Oral, Every 12 Hours Scheduled   Start Date:  1/31/2019     atorvastatin 80 MG tablet  Commonly known as:  LIPITOR   80 mg, Oral, Nightly      HYDROcodone-acetaminophen 5-325 MG per tablet  Commonly known as:  NORCO  Replaces:  HYDROcodone-acetaminophen 7.5-325 MG per tablet   1 tablet, Oral, Every 6 Hours PRN      nystatin 488174 UNIT/GM powder  Commonly known as:  MYCOSTATIN   Topical, Every 12 Hours  Scheduled      ondansetron 4 MG/2ML injection  Commonly known as:  ZOFRAN  Replaces:  ondansetron 4 MG tablet   4 mg, Intravenous, Every 6 Hours PRN         Continue These Medications      Instructions Start Date   aspirin 81 MG chewable tablet   81 mg, Oral, Daily      carvedilol 12.5 MG tablet  Commonly known as:  COREG   12.5 mg, Oral, 2 Times Daily With Meals      clonazePAM 1 MG tablet  Commonly known as:  KlonoPIN   1 mg, Oral, 2 Times Daily PRN      diltiaZEM 120 MG 24 hr capsule  Commonly known as:  TIAZAC   120 mg, Oral, Daily      diphenoxylate-atropine 2.5-0.025 MG per tablet  Commonly known as:  LOMOTIL   1 tablet, Oral, Daily PRN      lamoTRIgine 25 MG tablet  Commonly known as:  LaMICtal   25 mg, Oral, 2 Times Daily         Stop These Medications    acetaminophen 500 MG tablet  Commonly known as:  TYLENOL  Replaced by:  acetaminophen 650 MG suppository     albuterol (2.5 MG/3ML) 0.083% nebulizer solution  Commonly known as:  PROVENTIL     bisacodyl 5 MG EC tablet  Commonly known as:  DULCOLAX     docusate sodium 100 MG capsule  Commonly known as:  COLACE     furosemide 40 MG tablet  Commonly known as:  LASIX     HYDROcodone-acetaminophen 7.5-325 MG per tablet  Commonly known as:  NORCO  Replaced by:  HYDROcodone-acetaminophen 5-325 MG per tablet     magnesium oxide 400 (241.3 Mg) MG tablet tablet  Commonly known as:  MAGOX     Melatonin 3 MG capsule     meprobamate 400 MG tablet  Commonly known as:  EQUANIL     ondansetron 4 MG tablet  Commonly known as:  ZOFRAN  Replaced by:  ondansetron 4 MG/2ML injection     potassium chloride 10 MEQ CR tablet  Commonly known as:  K-DUR     ramipril 2.5 MG capsule  Commonly known as:  ALTACE     simvastatin 10 MG tablet  Commonly known as:  ZOCOR     vitamin D 37545 units capsule capsule  Commonly known as:  ERGOCALCIFEROL          Discharge Diet:   Diet Instructions     Diet: Dysphagia, Cardiac; Nectar / Syrup Thick Liquids; Mechanical Soft      Discharge Diet:    Dysphagia  Cardiac       Fluid Consistency:  Nectar / Syrup Thick Liquids    Pureed Options:  Mechanical Soft        Activity at Discharge:    Activity Instructions     Activity as Tolerated      Per PT - Pt performed STS transfer from EOB with Oj x2. Pt required maxA x2 to take four steps from bed to chair with RW. Pt demonstrated poor safety awareness and difficulty following commands; limited by confusion. Pt c/o increased B LE weakness    Per OT - Pt session limited d/t elevated HR and poor sustained attention requiring MAX VCs to participate w/ ADL tasks and HEP.        Follow-up Appointments  No future appointments.  Additional Instructions for the Follow-ups that You Need to Schedule     Discharge Follow-up with PCP   As directed       Currently Documented PCP:    Keaton Urrutia MD    PCP Phone Number:    [phone]     Follow Up Details:  one week after d/c from [facility]         Discharge Follow-up with Specified Provider: Neurology Clinic; 1 Month   As directed      To:  Neurology Clinic    Follow Up:  1 Month             Discharge Instructions:  Ok to go to [facility] by ambulance today  Follow up as above  Meds as above  Continue to work with PT/OT/SLP  Continue diet as above     BEAR Calabrese, AGACNP-BC, FNP-BC  Pulmonary & Critical Care Medicine    Time: Discharge 45 min    CC: Keaton Urrutia MD

## 2019-01-25 NOTE — PLAN OF CARE
Problem: Patient Care Overview  Goal: Plan of Care Review  Outcome: Ongoing (interventions implemented as appropriate)   01/25/19 0513   OTHER   Outcome Summary Patient has been oriented. C/o pain early in shift, relieved with prn medication. Pt has gotten more sleep tonight. No complaints at this time. VSS, will continue to monitor.      Goal: Individualization and Mutuality  Outcome: Ongoing (interventions implemented as appropriate)    Goal: Discharge Needs Assessment  Outcome: Ongoing (interventions implemented as appropriate)    Goal: Interprofessional Rounds/Family Conf  Outcome: Ongoing (interventions implemented as appropriate)      Problem: Skin Injury Risk (Adult)  Goal: Skin Health and Integrity  Outcome: Ongoing (interventions implemented as appropriate)      Problem: Stroke (Ischemic) (Adult)  Goal: Signs and Symptoms of Listed Potential Problems Will be Absent, Minimized or Managed (Stroke)  Outcome: Ongoing (interventions implemented as appropriate)      Problem: Fall Risk (Adult)  Goal: Absence of Fall  Outcome: Ongoing (interventions implemented as appropriate)